# Patient Record
Sex: FEMALE | Race: ASIAN | NOT HISPANIC OR LATINO | Employment: FULL TIME | URBAN - METROPOLITAN AREA
[De-identification: names, ages, dates, MRNs, and addresses within clinical notes are randomized per-mention and may not be internally consistent; named-entity substitution may affect disease eponyms.]

---

## 2017-01-09 ENCOUNTER — ALLSCRIPTS OFFICE VISIT (OUTPATIENT)
Dept: OTHER | Facility: OTHER | Age: 62
End: 2017-01-09

## 2017-01-09 ENCOUNTER — LAB REQUISITION (OUTPATIENT)
Dept: LAB | Facility: HOSPITAL | Age: 62
End: 2017-01-09
Payer: COMMERCIAL

## 2017-01-09 DIAGNOSIS — J06.9 ACUTE UPPER RESPIRATORY INFECTION: ICD-10-CM

## 2017-01-09 LAB
FLUAV AG SPEC QL IA: POSITIVE
FLUBV AG SPEC QL IA: NEGATIVE

## 2017-01-09 PROCEDURE — 87400 INFLUENZA A/B EACH AG IA: CPT | Performed by: INTERNAL MEDICINE

## 2017-01-10 ENCOUNTER — GENERIC CONVERSION - ENCOUNTER (OUTPATIENT)
Dept: OTHER | Facility: OTHER | Age: 62
End: 2017-01-10

## 2017-01-24 ENCOUNTER — APPOINTMENT (OUTPATIENT)
Dept: PHYSICAL THERAPY | Facility: CLINIC | Age: 62
End: 2017-01-24
Payer: COMMERCIAL

## 2017-01-24 PROCEDURE — 97162 PT EVAL MOD COMPLEX 30 MIN: CPT

## 2017-01-26 ENCOUNTER — APPOINTMENT (OUTPATIENT)
Dept: PHYSICAL THERAPY | Facility: CLINIC | Age: 62
End: 2017-01-26
Payer: COMMERCIAL

## 2017-01-26 PROCEDURE — 97110 THERAPEUTIC EXERCISES: CPT

## 2017-01-26 PROCEDURE — 97012 MECHANICAL TRACTION THERAPY: CPT

## 2017-01-26 PROCEDURE — 97140 MANUAL THERAPY 1/> REGIONS: CPT

## 2017-01-30 ENCOUNTER — APPOINTMENT (OUTPATIENT)
Dept: PHYSICAL THERAPY | Facility: CLINIC | Age: 62
End: 2017-01-30
Payer: COMMERCIAL

## 2017-02-01 ENCOUNTER — APPOINTMENT (OUTPATIENT)
Dept: PHYSICAL THERAPY | Facility: CLINIC | Age: 62
End: 2017-02-01
Payer: COMMERCIAL

## 2017-02-01 PROCEDURE — 97110 THERAPEUTIC EXERCISES: CPT

## 2017-02-01 PROCEDURE — 97140 MANUAL THERAPY 1/> REGIONS: CPT

## 2017-02-06 ENCOUNTER — APPOINTMENT (OUTPATIENT)
Dept: PHYSICAL THERAPY | Facility: CLINIC | Age: 62
End: 2017-02-06
Payer: COMMERCIAL

## 2017-02-06 PROCEDURE — 97140 MANUAL THERAPY 1/> REGIONS: CPT

## 2017-02-06 PROCEDURE — 97110 THERAPEUTIC EXERCISES: CPT

## 2017-02-06 PROCEDURE — 97010 HOT OR COLD PACKS THERAPY: CPT

## 2017-02-08 ENCOUNTER — APPOINTMENT (OUTPATIENT)
Dept: PHYSICAL THERAPY | Facility: CLINIC | Age: 62
End: 2017-02-08
Payer: COMMERCIAL

## 2017-02-10 ENCOUNTER — APPOINTMENT (OUTPATIENT)
Dept: PHYSICAL THERAPY | Facility: CLINIC | Age: 62
End: 2017-02-10
Payer: COMMERCIAL

## 2017-02-10 PROCEDURE — 97140 MANUAL THERAPY 1/> REGIONS: CPT

## 2017-02-10 PROCEDURE — 97010 HOT OR COLD PACKS THERAPY: CPT

## 2017-02-10 PROCEDURE — 97110 THERAPEUTIC EXERCISES: CPT

## 2017-02-13 ENCOUNTER — APPOINTMENT (OUTPATIENT)
Dept: PHYSICAL THERAPY | Facility: CLINIC | Age: 62
End: 2017-02-13
Payer: COMMERCIAL

## 2017-02-13 PROCEDURE — 97140 MANUAL THERAPY 1/> REGIONS: CPT

## 2017-02-13 PROCEDURE — 97010 HOT OR COLD PACKS THERAPY: CPT

## 2017-02-13 PROCEDURE — 97110 THERAPEUTIC EXERCISES: CPT

## 2017-02-15 ENCOUNTER — APPOINTMENT (OUTPATIENT)
Dept: PHYSICAL THERAPY | Facility: CLINIC | Age: 62
End: 2017-02-15
Payer: COMMERCIAL

## 2017-02-20 ENCOUNTER — APPOINTMENT (OUTPATIENT)
Dept: PHYSICAL THERAPY | Facility: CLINIC | Age: 62
End: 2017-02-20
Payer: COMMERCIAL

## 2017-02-20 PROCEDURE — 97110 THERAPEUTIC EXERCISES: CPT

## 2017-02-20 PROCEDURE — 97140 MANUAL THERAPY 1/> REGIONS: CPT

## 2017-03-02 ENCOUNTER — HOSPITAL ENCOUNTER (OUTPATIENT)
Dept: RADIOLOGY | Facility: CLINIC | Age: 62
Discharge: HOME | End: 2017-03-02
Payer: COMMERCIAL

## 2017-03-02 ENCOUNTER — ALLSCRIPTS OFFICE VISIT (OUTPATIENT)
Dept: OTHER | Facility: OTHER | Age: 62
End: 2017-03-02

## 2017-03-02 DIAGNOSIS — M25.552 PAIN IN LEFT HIP: ICD-10-CM

## 2017-03-02 DIAGNOSIS — M25.551 PAIN IN RIGHT HIP: ICD-10-CM

## 2017-03-02 PROCEDURE — 73502 X-RAY EXAM HIP UNI 2-3 VIEWS: CPT

## 2017-04-11 ENCOUNTER — ALLSCRIPTS OFFICE VISIT (OUTPATIENT)
Dept: OTHER | Facility: OTHER | Age: 62
End: 2017-04-11

## 2017-10-30 ENCOUNTER — APPOINTMENT (OUTPATIENT)
Dept: PHYSICAL THERAPY | Facility: CLINIC | Age: 62
End: 2017-10-30
Payer: COMMERCIAL

## 2017-10-30 PROCEDURE — 97161 PT EVAL LOW COMPLEX 20 MIN: CPT

## 2017-10-30 PROCEDURE — G8990 OTHER PT/OT CURRENT STATUS: HCPCS

## 2017-10-30 PROCEDURE — G8991 OTHER PT/OT GOAL STATUS: HCPCS

## 2017-10-31 ENCOUNTER — APPOINTMENT (OUTPATIENT)
Dept: PHYSICAL THERAPY | Facility: CLINIC | Age: 62
End: 2017-10-31
Payer: COMMERCIAL

## 2018-01-10 NOTE — RESULT NOTES
Verified Results  (1) RAPID INFLUENZA SCREEN with reflex to PCR [id number] 11:00AM [facility]     Test Name Result Flag Reference   RAPID INFLUENZA A AGN Positive A Negative, Indeterminate   RAPID INFLUENZA B AGN Negative  Negative, Indeterminate

## 2018-01-12 VITALS
RESPIRATION RATE: 16 BRPM | BODY MASS INDEX: 21.4 KG/M2 | SYSTOLIC BLOOD PRESSURE: 98 MMHG | TEMPERATURE: 97.7 F | DIASTOLIC BLOOD PRESSURE: 60 MMHG | HEIGHT: 60 IN | HEART RATE: 68 BPM | WEIGHT: 109 LBS

## 2018-01-13 VITALS
WEIGHT: 110.31 LBS | SYSTOLIC BLOOD PRESSURE: 78 MMHG | DIASTOLIC BLOOD PRESSURE: 58 MMHG | BODY MASS INDEX: 21.66 KG/M2 | TEMPERATURE: 100.5 F | HEIGHT: 60 IN | HEART RATE: 72 BPM

## 2018-01-15 VITALS
SYSTOLIC BLOOD PRESSURE: 88 MMHG | WEIGHT: 109.5 LBS | DIASTOLIC BLOOD PRESSURE: 56 MMHG | HEIGHT: 60 IN | BODY MASS INDEX: 21.5 KG/M2

## 2018-02-22 PROBLEM — M16.12 PRIMARY OSTEOARTHRITIS OF LEFT HIP: Status: ACTIVE | Noted: 2017-03-02

## 2018-02-27 ENCOUNTER — OFFICE VISIT (OUTPATIENT)
Dept: FAMILY MEDICINE CLINIC | Facility: HOSPITAL | Age: 63
End: 2018-02-27
Payer: COMMERCIAL

## 2018-02-27 VITALS
BODY MASS INDEX: 21.48 KG/M2 | SYSTOLIC BLOOD PRESSURE: 116 MMHG | HEART RATE: 71 BPM | DIASTOLIC BLOOD PRESSURE: 68 MMHG | WEIGHT: 110 LBS

## 2018-02-27 DIAGNOSIS — R35.0 URINARY FREQUENCY: ICD-10-CM

## 2018-02-27 DIAGNOSIS — M51.36 DEGENERATIVE DISC DISEASE, LUMBAR: Primary | ICD-10-CM

## 2018-02-27 PROCEDURE — 99214 OFFICE O/P EST MOD 30 MIN: CPT | Performed by: INTERNAL MEDICINE

## 2018-02-27 RX ORDER — CELECOXIB 100 MG/1
100 CAPSULE ORAL DAILY
Qty: 30 CAPSULE | Refills: 0 | Status: SHIPPED | OUTPATIENT
Start: 2018-02-27 | End: 2018-12-17 | Stop reason: SDUPTHER

## 2018-02-27 RX ORDER — AZELASTINE 1 MG/ML
1 SPRAY, METERED NASAL 2 TIMES DAILY
COMMUNITY
End: 2021-09-01 | Stop reason: SDUPTHER

## 2018-02-27 NOTE — PROGRESS NOTES
Assessment/Plan:       Diagnoses and all orders for this visit:    Degenerative disc disease, lumbar  -     Ambulatory referral to Physical Therapy; Future  -     celecoxib (CeleBREX) 100 mg capsule; Take 1 capsule (100 mg total) by mouth daily    Urinary frequency  -     UA w Reflex to Microscopic w Reflex to Culture    Other orders  -     azelastine (ASTELIN) 0 1 % nasal spray; 1 spray into each nostril 2 (two) times a day  -     Cetirizine HCl 10 MG CAPS; Take 1 tablet by mouth daily          All of the above diagnoses have been assessed  Additional COMMENTS/PLAN:   Patient will get a urine specimen if there is no evidence of infection she still has urinary symptoms may need Urology evaluation  If the patient does not improve after a month of conservative treatment for back, would consider MRI  Subjective:      Patient ID: Kathy Nova is a 59 y o  female  HPI    The patient has a history of cervical and lumbar spine disease as evidenced by previous MRI she showed before 2013 2014  Patient for the last year as had some problems with low back pain but since the last 2 months  Patient has had increasing pain in the low back radiating down the right leg in the anterior medial portion of the leg to the knee  She also has pain of the great toe  It is worse with standing for prolonged period time walking  Rest makes it better  Does not cause insomnia  Has not taken any OTC meds  The following portions of the patient's history were revised and updated as appropriate: Problem list, allergies, med list, FH, SH, Past medical and surgical histories      Current Outpatient Prescriptions   Medication Sig Dispense Refill    azelastine (ASTELIN) 0 1 % nasal spray 1 spray into each nostril 2 (two) times a day      Cetirizine HCl 10 MG CAPS Take 1 tablet by mouth daily      celecoxib (CeleBREX) 100 mg capsule Take 1 capsule (100 mg total) by mouth daily 30 capsule 0     No current facility-administered medications for this visit  Review of Systems   Constitutional: Negative  Respiratory: Negative  Cardiovascular: Negative  Gastrointestinal: Negative  Genitourinary:        Has urinary frequency-no dysuria         Objective:    /68   Pulse 71   Wt 49 9 kg (110 lb)   BMI 21 48 kg/m²      Physical Exam   Musculoskeletal:   Examination spine reveals no point tenderness  There is full range of motion of the spine  Straight leg raise is negative  There is no motor weakness  There is no decrement pinprick  Deep tendon reflexes are 2+ bilaterally the toes are downgoing bilaterally  No visits with results within 2 Week(s) from this visit     Latest known visit with results is:   Lab Requisition on 01/09/2017   Component Date Value Ref Range Status    Rapid Influenza A Ag 01/09/2017 Positive* Negative, Indeterminate Final    Rapid Influenza B Ag 01/09/2017 Negative  Negative, Indeterminate Final         Huong Castano MD

## 2018-02-28 DIAGNOSIS — M54.9 ACUTE BACK PAIN, UNSPECIFIED BACK LOCATION, UNSPECIFIED BACK PAIN LATERALITY: Primary | ICD-10-CM

## 2018-02-28 RX ORDER — CYCLOBENZAPRINE HCL 10 MG
10 TABLET ORAL 2 TIMES DAILY PRN
Qty: 40 TABLET | Refills: 1 | Status: SHIPPED | OUTPATIENT
Start: 2018-02-28 | End: 2019-01-02 | Stop reason: SDUPTHER

## 2018-03-06 ENCOUNTER — EVALUATION (OUTPATIENT)
Dept: PHYSICAL THERAPY | Facility: CLINIC | Age: 63
End: 2018-03-06
Payer: COMMERCIAL

## 2018-03-06 DIAGNOSIS — M54.16 LUMBAR RADICULOPATHY: Primary | ICD-10-CM

## 2018-03-06 DIAGNOSIS — M51.36 DEGENERATIVE DISC DISEASE, LUMBAR: ICD-10-CM

## 2018-03-06 PROCEDURE — 97161 PT EVAL LOW COMPLEX 20 MIN: CPT | Performed by: PHYSICAL THERAPIST

## 2018-03-06 PROCEDURE — G8991 OTHER PT/OT GOAL STATUS: HCPCS | Performed by: PHYSICAL THERAPIST

## 2018-03-06 PROCEDURE — G8990 OTHER PT/OT CURRENT STATUS: HCPCS | Performed by: PHYSICAL THERAPIST

## 2018-03-06 NOTE — PROGRESS NOTES
PT Evaluation     Today's date: 3/6/2018  Patient name: Zonia Cronin  : 1955  MRN: [id number]  Referring provider: Cory Jones MD  Dx:   Encounter Diagnosis     ICD-10-CM    1  Degenerative disc disease, lumbar M51 36 Ambulatory referral to Physical Therapy                  Assessment/Plan     Pt presents to outpatient PT with pain, decreased rom, decreased strength and decreased functional mobility  She will benefit from skilled PT to address these deficits in order to achieve her goals and maximize her functional mobility  PT 2-3x's/week for 4-6 weeks    Short Term Goals: Independent performance of initial hep  Decrease pain 2 points on VAS      Long Term Goals: Independent performance of comprehensive hep  Work performance is returned to max level of function  Performance of IADL's is returned to max level of function  Performance in recreational activities is improved to max level of function          Subjective     Pt reports a history or chronic LBP  Reports that this has been aggravated for 2-3 months that she attributes to working longer hours at work  Reports that she has increased pain with sitting/standing for extended periods of time  No new imaging conducted        Pain currently 7/10  At worst     Objective     ROM:   Flexion: wfl   Extension: mod limited   Right Rotation: min limited   Left Rotation: min limited   Right Lateral Flexion: min limited   Left Lateral Flexion:  Min limited  Stiffness reported with end range motion in all planes        Poor control of abdominals noted with TaA  Hypomobility noted with spring testing  Straight Leg Raise: neg  Slump Test: neg  Prone Knee Bend: neg  Directional testing:no change            Precautions: none    Daily Treatment Diary     Manual              Lumbar pa's               lumbopelvic grade 5                                                        Exercise Diary              bike             Hs stretch             ltr Prone press ups             Gluteal stretch             TaA             TaA fabienne             pball squats                                                                                                                                                                             Modalities

## 2018-03-07 ENCOUNTER — APPOINTMENT (OUTPATIENT)
Dept: PHYSICAL THERAPY | Facility: CLINIC | Age: 63
End: 2018-03-07
Payer: COMMERCIAL

## 2018-03-09 ENCOUNTER — OFFICE VISIT (OUTPATIENT)
Dept: PHYSICAL THERAPY | Facility: CLINIC | Age: 63
End: 2018-03-09
Payer: COMMERCIAL

## 2018-03-09 DIAGNOSIS — M51.36 DEGENERATIVE DISC DISEASE, LUMBAR: Primary | ICD-10-CM

## 2018-03-09 DIAGNOSIS — M54.16 LUMBAR RADICULOPATHY: ICD-10-CM

## 2018-03-09 PROCEDURE — 97112 NEUROMUSCULAR REEDUCATION: CPT

## 2018-03-09 PROCEDURE — 97110 THERAPEUTIC EXERCISES: CPT

## 2018-03-09 NOTE — PROGRESS NOTES
Daily Note     Today's date: 3/9/2018  Patient name: Breanne Barahona  : 1955  MRN: [id number]  Referring provider: Beni Malcolm MD  Dx:   Encounter Diagnosis     ICD-10-CM    1  Degenerative disc disease, lumbar M51 36    2  Lumbar radiculopathy M54 16            1:1 with PT MB 12:30- 1:00  1:1 with PTA CR 1:00- 1:15  Subjective: Reports feeling better since I E  Compliance with HEP offering no questions or concerns  L-S pain 6/10  Denies radicular symptoms  Onset of right UT pain yesterday  Objective: See treatment diary below      Precautions: none    Daily Treatment Diary     Manual  3/9            Lumbar pa's   8 mins PT KL            lumbopelvic grade 5 NP                                                       Exercise Diary  3/9            bike 10 mins            Hs stretch 30"x4            ltr   10"x10            Prone press ups 5"x10            Gluteal stretch 30"x4            TaA 5"x20            TaA clamshell RTB x20ea  pball squats NV                                                                                                                                                                            Modalities  3/9                                                       Assessment: Tolerated treatment well  Progressed through  [facility] as charted without complaint  Deferred modalities post  Progress as appropriate  Patient exhibited good technique with therapeutic exercises and would benefit from continued PT      Plan: Continue per plan of care

## 2018-03-14 ENCOUNTER — OFFICE VISIT (OUTPATIENT)
Dept: PHYSICAL THERAPY | Facility: CLINIC | Age: 63
End: 2018-03-14
Payer: COMMERCIAL

## 2018-03-14 DIAGNOSIS — M51.36 DEGENERATIVE DISC DISEASE, LUMBAR: Primary | ICD-10-CM

## 2018-03-14 DIAGNOSIS — M54.16 LUMBAR RADICULOPATHY: ICD-10-CM

## 2018-03-14 PROCEDURE — 97112 NEUROMUSCULAR REEDUCATION: CPT | Performed by: PHYSICAL THERAPIST

## 2018-03-14 PROCEDURE — 97010 HOT OR COLD PACKS THERAPY: CPT | Performed by: PHYSICAL THERAPIST

## 2018-03-14 PROCEDURE — 97110 THERAPEUTIC EXERCISES: CPT | Performed by: PHYSICAL THERAPIST

## 2018-03-14 NOTE — PROGRESS NOTES
Daily Note     Today's date: 3/14/2018  Patient name: Rose Hollins  : 1955  MRN: [id number]  Referring provider: Yasmany Barker MD  Dx:   Encounter Diagnosis     ICD-10-CM    1  Degenerative disc disease, lumbar M51 36    2  Lumbar radiculopathy M54 16            1:1 for duration of session, ROSETTE from 12:00-12:15pm, MD from 12:15-1:00pm       Subjective: Pt reports her pain has been relatively unchanged since last session  She rates her pain at 6/10 prior to session and 5/10 at conclusion  Objective: See treatment diary below      Precautions: none    Daily Treatment Diary     Manual  3/9 3/14           Lumbar pa's   8 mins PT KL 8 min             lumbopelvic grade 5 NP NP                                                      Exercise Diary  3/9 3/14           bike 10 mins 10 min           Hs stretch 30"x4 30"x4           ltr   10"x10 10" x10  *pain  Hold          Prone press ups 5"x10 5" x 10           Gluteal stretch 30"x4 30" x 4           TaA 5"x20 5" x 20           TaA clamshell RTB x20ea  RTB   5" x 20 ea           pball squats NV NV                                                                                                                                                                           Modalities  3/9 3/14           MHP L-spine in prone  10 min                                         Assessment:  Pt tolerated treatment well and reported decreased pain at conclusion of session  She noted increase in lumbar pain with completion of LTR, therefore, will hold this exercise next visit  Patient exhibited good technique with therapeutic exercises and would benefit from continued PT      Plan: Continue per plan of care

## 2018-03-16 ENCOUNTER — OFFICE VISIT (OUTPATIENT)
Dept: PHYSICAL THERAPY | Facility: CLINIC | Age: 63
End: 2018-03-16
Payer: COMMERCIAL

## 2018-03-16 DIAGNOSIS — M54.16 LUMBAR RADICULOPATHY: ICD-10-CM

## 2018-03-16 DIAGNOSIS — M51.36 DEGENERATIVE DISC DISEASE, LUMBAR: Primary | ICD-10-CM

## 2018-03-16 PROCEDURE — 97110 THERAPEUTIC EXERCISES: CPT

## 2018-03-16 PROCEDURE — 97010 HOT OR COLD PACKS THERAPY: CPT

## 2018-03-16 PROCEDURE — 97140 MANUAL THERAPY 1/> REGIONS: CPT

## 2018-03-16 PROCEDURE — 97112 NEUROMUSCULAR REEDUCATION: CPT

## 2018-03-16 NOTE — PROGRESS NOTES
Daily Note     Today's date: 3/16/2018  Patient name: Elgin Zendejas  : 1955  MRN: [id number]  Referring provider: Shannon Jarrett MD  Dx:   Encounter Diagnosis     ICD-10-CM    1  Degenerative disc disease, lumbar M51 36    2  Lumbar radiculopathy M54 16                   Subjective: Patient reports she still has pain  Sometimes the stretching relieves her symptoms  Would like heat on upper and lower back today  Objective: See treatment diary below      Assessment: Patient reported some numbness going into RLE after attempt to perform mini squats  Patient reported numbness dissipated after manual therapy  Remaining TE went well and did not increase pain  Tolerated treatment well  Patient would benefit from continued PT      Plan: Continue per plan of care  Precautions: none     Daily Treatment Diary      Manual  3/9 3/14  3/16                  Lumbar pa's    8 mins PT KL 8 min     8 min KL, PT                 lumbopelvic grade 5 NP NP                                                                                                 Exercise Diary  3/9 3/14  3/16                 bike 10 mins 10 min  10 min                 Hs stretch 30"x4 30"x4  30" x4                 ltr    10"x10 10" x10  *pain  Hold                 Prone press ups 5"x10 5" x 10  5"x10                 Gluteal stretch 30"x4 30" x 4  30"x4                 TaA 5"x20 5" x 20  5"x20                 TaA clamshell RTB x20ea   RTB   5" x 20 ea  RTB 5"x20                 pball squats NV NV  20x                                                                                                                                                                                                                                                                                                                       Modalities  3/9 3/14                   MHP L-spine in prone   10 min

## 2018-03-20 ENCOUNTER — OFFICE VISIT (OUTPATIENT)
Dept: PHYSICAL THERAPY | Facility: CLINIC | Age: 63
End: 2018-03-20
Payer: COMMERCIAL

## 2018-03-20 DIAGNOSIS — M54.16 LUMBAR RADICULOPATHY: ICD-10-CM

## 2018-03-20 DIAGNOSIS — M51.36 DEGENERATIVE DISC DISEASE, LUMBAR: Primary | ICD-10-CM

## 2018-03-20 PROCEDURE — 97112 NEUROMUSCULAR REEDUCATION: CPT | Performed by: PHYSICAL THERAPIST

## 2018-03-20 PROCEDURE — 97140 MANUAL THERAPY 1/> REGIONS: CPT | Performed by: PHYSICAL THERAPIST

## 2018-03-20 PROCEDURE — 97110 THERAPEUTIC EXERCISES: CPT | Performed by: PHYSICAL THERAPIST

## 2018-03-20 NOTE — PROGRESS NOTES
Daily Note     Today's date: 3/20/2018  Patient name: Bhavesh Cleary  : 1955  MRN: [id number]  Referring provider: Vivien Garcia MD  Dx:   Encounter Diagnosis     ICD-10-CM    1  Degenerative disc disease, lumbar M51 36    2  Lumbar radiculopathy M54 16        Start Time: 1205  Stop Time: 1250  Total time in clinic (min): 45 minutes    Subjective: Patient notes she is feeling less stiff overall but continues to have 8/10 lumbar spine pain that can radiate down B LE      Objective: See treatment diary below  Manual  3/9 3/14  3/16   3/20               Lumbar pa's    8 mins PT KL 8 min     8 min KL, PT  8'  RS PT               lumbopelvic grade 5 NP NP                                                                                                 Exercise Diary  3/9 3/14  3/16  3/20               bike 10 mins 10 min  10 min  10'               Hs stretch 30"x4 30"x4  30" x4  30" x4               ltr    10"x10 10" x10  *pain  Hold                 Prone press ups 5"x10 5" x 10  5"x10  5" x10               Gluteal stretch 30"x4 30" x 4  30"x4  30" x4               TaA 5"x20 5" x 20  5"x20  5" x20               TaA clamshell RTB x20ea  RTB   5" x 20 ea  RTB 5"x20  RTB 5" x20 ea               pball squats NV NV  20x  20x                TrA hip ADD with pillow, supine       10x10"                standing hip ABD        20x each                                                                                                                                                                                                                                                                     Modalities  3/9 3/14                   MHP L-spine in prone   10 min                                                  Assessment: Tolerated treatment well  Patient demonstrated fatigue post treatment and would benefit from continued PT  Patient had improved lumbar mobility post manuals    Patient was progressed with program as able to improve core stability with good tolerance  Plan: Continue per plan of care

## 2018-03-23 ENCOUNTER — APPOINTMENT (OUTPATIENT)
Dept: PHYSICAL THERAPY | Facility: CLINIC | Age: 63
End: 2018-03-23
Payer: COMMERCIAL

## 2018-03-27 ENCOUNTER — OFFICE VISIT (OUTPATIENT)
Dept: PHYSICAL THERAPY | Facility: CLINIC | Age: 63
End: 2018-03-27
Payer: COMMERCIAL

## 2018-03-27 DIAGNOSIS — M54.16 LUMBAR RADICULOPATHY: Primary | ICD-10-CM

## 2018-03-27 PROCEDURE — 97140 MANUAL THERAPY 1/> REGIONS: CPT

## 2018-03-27 PROCEDURE — 97010 HOT OR COLD PACKS THERAPY: CPT

## 2018-03-27 PROCEDURE — 97110 THERAPEUTIC EXERCISES: CPT

## 2018-03-27 NOTE — PROGRESS NOTES
Daily Note     Today's date: 3/27/2018  Patient name: Tati Roberson  : 1955  MRN: [id number]  Referring provider: Carlin Del Toro MD  Dx:   No diagnosis found  Subjective: Patient complains she is still having pain in her back - reports taking Celebrex and muscle relaxer helps to reduce pain however she does not like taking these medications often as they "sometimes make me feel tired and depressed "  Patient reports she walked 1 1/2 miles on  and felt "okay "    Objective: See treatment diary below  X=performed    Manual  3/9 3/14  3/16   3/20  3/27             Lumbar pa's    8 mins PT KL 8 min     8 min KL, PT  8'  RS PT  NP             lumbopelvic grade 5 NP NP                   STM R lumbar      10'              Exercise Diary  3/9 3/14  3/16  3/20  3/27             bike 10 mins 10 min  10 min  10'  10'             Hs stretch 30"x4 30"x4  30" x4  30" x4  30"x4             ltr    10"x10 10" x10  *pain  Hold                 Prone press ups 5"x10 5" x 10  5"x10  5" x10               Gluteal stretch 30"x4 30" x 4  30"x4  30" x4  30"x4             TaA 5"x20 5" x 20  5"x20  5" x20  5"x20             TaA clamshell RTB x20ea  RTB   5" x 20 ea  RTB 5"x20  RTB 5" x20 ea  red tb  2x10             pball squats NV NV  20x  20x                ball squeeze       10x10"  10"x10              standing hip ABD        20x each                     Modalities  3/9 3/14  3/27                 MHP L-spine in prone   10 min  10 min                    Assessment: Patient complains of moderate discomfort in the low back when performing ball squeeze  Pain relief reported with STM  Plan: Continue treatment as per PT plan of care

## 2018-04-03 ENCOUNTER — OFFICE VISIT (OUTPATIENT)
Dept: PHYSICAL THERAPY | Facility: CLINIC | Age: 63
End: 2018-04-03
Payer: COMMERCIAL

## 2018-04-03 DIAGNOSIS — M54.16 LUMBAR RADICULOPATHY: Primary | ICD-10-CM

## 2018-04-03 PROCEDURE — 97110 THERAPEUTIC EXERCISES: CPT

## 2018-04-03 PROCEDURE — 97140 MANUAL THERAPY 1/> REGIONS: CPT

## 2018-04-03 PROCEDURE — 97010 HOT OR COLD PACKS THERAPY: CPT

## 2018-04-03 NOTE — PROGRESS NOTES
Daily Note     Today's date: 4/3/2018  Patient name: Saritha Alas  : 1955  MRN: [id number]  Referring provider: Artie Knapp MD  Dx:   No diagnosis found  Subjective: Patient reports back pain was improved after the last visit  Objective: See treatment diary below  PT performs mobilizations as noted  X=performed    Manual  3/9 3/14  3/16   3/20  3/27  4/3           Lumbar pa's    8 mins PT KL 8 min     8 min KL, PT  8'  RS PT  NP KL, PT           lumbopelvic grade 5 NP NP                   STM R lumbar      JLW JLW       Hamstring stretch      30"x3             Exercise Diary  3/9 3/14  3/16  3/20  3/27  4/3           bike 10 mins 10 min  10 min  10'  10'  10'           Hs stretch 30"x4 30"x4  30" x4  30" x4  30"x4  30"x4           ltr    10"x10 10" x10  *pain  Hold                 Prone press ups 5"x10 5" x 10  5"x10  5" x10               Gluteal stretch 30"x4 30" x 4  30"x4  30" x4  30"x4  30"x4           TaA 5"x20 5" x 20  5"x20  5" x20  5"x20  5"x20           TaA clamshell RTB x20ea  RTB   5" x 20 ea  RTB 5"x20  RTB 5" x20 ea  red tb  2x10  red tb  2x10           pball squats NV NV  20x  20x                ball squeeze       10x10"  10"x10  10"x10            standing hip ABD        20x each                     Modalities  3/9 3/14  3/27  4/3               MHP L-spine in prone   10 min  10 min  10 min                  Assessment: No complaints reported throughout therapeutic exercise program   Patient reports back pain is improved with manual hamstring stretching and massage  Plan: Continue treatment as per PT plan of care

## 2018-04-17 ENCOUNTER — OFFICE VISIT (OUTPATIENT)
Dept: PHYSICAL THERAPY | Facility: CLINIC | Age: 63
End: 2018-04-17
Payer: COMMERCIAL

## 2018-04-17 DIAGNOSIS — M54.16 LUMBAR RADICULOPATHY: Primary | ICD-10-CM

## 2018-04-17 PROCEDURE — 97530 THERAPEUTIC ACTIVITIES: CPT | Performed by: PHYSICAL THERAPIST

## 2018-04-17 PROCEDURE — 97140 MANUAL THERAPY 1/> REGIONS: CPT | Performed by: PHYSICAL THERAPIST

## 2018-04-17 PROCEDURE — G8991 OTHER PT/OT GOAL STATUS: HCPCS | Performed by: PHYSICAL THERAPIST

## 2018-04-17 PROCEDURE — G8990 OTHER PT/OT CURRENT STATUS: HCPCS | Performed by: PHYSICAL THERAPIST

## 2018-04-17 PROCEDURE — 97110 THERAPEUTIC EXERCISES: CPT | Performed by: PHYSICAL THERAPIST

## 2018-04-17 NOTE — PROGRESS NOTES
Daily Note     Today's date: 2018  Patient name: Alf Ayala  : 1955  MRN: [id number]  Referring provider: Leslie Baker MD  Dx:   No diagnosis found  Subjective: Patient reports back pain was improved after the last visit  Objective: See treatment diary below  PT performs mobilizations as noted  X=performed    Manual  3/9 3/14  3/16   3/20  3/27  4/3  4/17         Lumbar pa's    8 mins PT KL 8 min     8 min KL, PT  8'  RS PT  NP KL, PT  kl         lumbopelvic grade 5 NP NP                   STM R lumbar      JLW JLW kl      Hamstring stretch      30"x3 3            Exercise Diary  3/9 3/14  3/16  3/20  3/27  4/3  4/17         bike 10 mins 10 min  10 min  10'  10'  10'  10         Hs stretch 30"x4 30"x4  30" x4  30" x4  30"x4  30"x4           ltr    10"x10 10" x10  *pain  Hold                 Prone press ups 5"x10 5" x 10  5"x10  5" x10               Gluteal stretch 30"x4 30" x 4  30"x4  30" x4  30"x4  30"x4           TaA 5"x20 5" x 20  5"x20  5" x20  5"x20  5"x20           TaA clamshell RTB x20ea  RTB   5" x 20 ea  RTB 5"x20  RTB 5" x20 ea  red tb  2x10  red tb  2x10           pball squats NV NV  20x  20x                ball squeeze       10x10"  10"x10  10"x10            standing hip ABD        20x each                     Modalities  3/9 3/14  3/27  4/3               MHP L-spine in prone   10 min  10 min  10 min                  Assessment: No complaints reported throughout therapeutic exercise program   Patient reports back pain is improved with manual hamstring stretching and massage  Plan: Continue treatment as per PT plan of care

## 2018-04-17 NOTE — PROGRESS NOTES
PT Evaluation     Today's date: 2018  Patient name: Virgil Niño  : 1955  MRN: [id number]  Referring provider: Kristy Elias MD  Dx:   No diagnosis found  Assessment/Plan     Pt is being treated with outpatient PT  She has made slow gains in rom, strength, pain reduction and functional mobility but continues to have deficits  She is independent with her current hep and reports partial compliance with it  She may benefit from additional imagining at this time  Please advice on any updated status  Thank you for this referral       Plan: Hold PT at this time  Short Term Goals: Independent performance of initial hep-met  Decrease pain 2 points on VAS-met      Long Term Goals: Independent performance of comprehensive hep-partially met  Work performance is returned to max level of function-partially met  Performance of IADL's is returned to max level of function-partially met  Performance in recreational activities is improved to max level of function-partially met        Subjective     Pt reports overall reduction in pain since started PT but that she continues to have deficits  Reports that the intensity of her pain is decreased but that she continues to have significant pain after sitting for extended periods or time  Reports that she is able to walk for longer periods of time          Pain currently 3/10  At worst   5/10    Objective     ROM:   Flexion: wfl   Extension: mod limited   Right Rotation: wfl   Left Rotation: wfl   Right Lateral Flexion: wfl   Left Lateral Flexion:  Min wfl  Stiffness reported with end range motion in all planes      improved control of abdominals noted with TaA but pt continues to fatigue with a dynamic challenge    Straight Leg Raise: neg  Slump Test: neg  Prone Knee Bend: neg  Directional testing:no change  Reactive trigger points in R piriformis and paraspinals              Manual  3/9 3/14  3/16   3/20  3/27  4/3  4/17         Lumbar pa's    8 mins PT KL 8 min     8 min KL, PT  8'  RS PT  NP KL, PT  kl         lumbopelvic grade 5 NP NP                   STM R lumbar      JLW JLW kl      Hamstring stretch      30"x3 3            Exercise Diary  3/9 3/14  3/16  3/20  3/27  4/3  4/17         bike 10 mins 10 min  10 min  10'  10'  10'  10         Hs stretch 30"x4 30"x4  30" x4  30" x4  30"x4  30"x4           ltr    10"x10 10" x10  *pain  Hold                 Prone press ups 5"x10 5" x 10  5"x10  5" x10               Gluteal stretch 30"x4 30" x 4  30"x4  30" x4  30"x4  30"x4           TaA 5"x20 5" x 20  5"x20  5" x20  5"x20  5"x20           TaA clamshell RTB x20ea   RTB   5" x 20 ea  RTB 5"x20  RTB 5" x20 ea  red tb  2x10  red tb  2x10           pball squats NV NV  20x  20x                ball squeeze       10x10"  10"x10  10"x10            standing hip ABD        20x each                     Modalities  3/9 3/14  3/27  4/3               MHP L-spine in prone   10 min  10 min  10 min

## 2018-05-10 ENCOUNTER — OFFICE VISIT (OUTPATIENT)
Dept: FAMILY MEDICINE CLINIC | Facility: HOSPITAL | Age: 63
End: 2018-05-10
Payer: COMMERCIAL

## 2018-05-10 VITALS
DIASTOLIC BLOOD PRESSURE: 56 MMHG | SYSTOLIC BLOOD PRESSURE: 92 MMHG | WEIGHT: 111 LBS | BODY MASS INDEX: 21.79 KG/M2 | HEART RATE: 65 BPM | OXYGEN SATURATION: 98 % | HEIGHT: 60 IN

## 2018-05-10 DIAGNOSIS — M25.551 PAIN OF RIGHT HIP JOINT: Primary | ICD-10-CM

## 2018-05-10 DIAGNOSIS — Z12.11 SCREENING FOR COLON CANCER: ICD-10-CM

## 2018-05-10 PROCEDURE — 99213 OFFICE O/P EST LOW 20 MIN: CPT | Performed by: PHYSICIAN ASSISTANT

## 2018-05-10 NOTE — PROGRESS NOTES
Assessment/Plan:         Diagnoses and all orders for this visit:    Pain of right hip joint  -     XR hip/pelv 2-3 vws right if performed; Future  -     XR spine lumbar minimum 4 views non injury; Future  -     Ambulatory referral to Gastroenterology; Future    Screening for colon cancer  -     Ambulatory referral to Gastroenterology; Future        Subjective:      Patient ID: Karol Hess is a 59 y o  female  59year old  female c/o lower back pain, radiating to the right hip/thigh, and down right leg  Walking worsens pain, and prolonged sitting  Had physical therapy about 2 months  Occasionally takes pain medications  Eats 3 meals daily with protein  Back Pain   Pertinent negatives include no abdominal pain, dysuria, fever or numbness  Review of Systems   Constitutional: Negative for chills, diaphoresis, fatigue and fever  Respiratory: Negative for cough and shortness of breath  Gastrointestinal: Negative for abdominal pain, anal bleeding, blood in stool, constipation, diarrhea, nausea and vomiting  Endocrine: Negative for polydipsia, polyphagia and polyuria  Genitourinary: Positive for urgency  Negative for dysuria and frequency  Musculoskeletal: Positive for arthralgias, back pain, gait problem, myalgias, neck pain and neck stiffness  Neurological: Negative for numbness  Pain from right side of lower back down right leg  Objective:      BP 92/56   Pulse 65   Ht 5' (1 524 m)   Wt 50 3 kg (111 lb)   LMP  (LMP Unknown)   SpO2 98%   BMI 21 68 kg/m²          Physical Exam   Constitutional: She is oriented to person, place, and time  She appears well-developed and well-nourished  No distress  HENT:   Head: Normocephalic and atraumatic  Eyes: Conjunctivae and EOM are normal  Right eye exhibits no discharge  Left eye exhibits no discharge  No scleral icterus  Cardiovascular: Normal rate, regular rhythm and normal heart sounds      Pulmonary/Chest: Effort normal and breath sounds normal  No respiratory distress  She has no wheezes  She has no rales  Musculoskeletal: Normal range of motion  She exhibits tenderness  She exhibits no edema or deformity  Lower  to palpation  Neurological: She is alert and oriented to person, place, and time  Skin: She is not diaphoretic  Psychiatric: She has a normal mood and affect  Her behavior is normal  Judgment and thought content normal    Nursing note and vitals reviewed

## 2018-05-10 NOTE — PATIENT INSTRUCTIONS
Discussed patient's pain medications, and meal planning  Will order  XR of hip and lower back area  Referred to get colonoscopy

## 2018-05-17 ENCOUNTER — HOSPITAL ENCOUNTER (OUTPATIENT)
Dept: RADIOLOGY | Facility: HOSPITAL | Age: 63
Discharge: HOME | End: 2018-05-17
Payer: COMMERCIAL

## 2018-05-17 ENCOUNTER — TRANSCRIBE ORDERS (OUTPATIENT)
Dept: LAB | Facility: HOSPITAL | Age: 63
End: 2018-05-17

## 2018-05-17 DIAGNOSIS — M25.551 PAIN OF RIGHT HIP JOINT: ICD-10-CM

## 2018-05-17 PROCEDURE — 72110 X-RAY EXAM L-2 SPINE 4/>VWS: CPT

## 2018-05-17 PROCEDURE — 73502 X-RAY EXAM HIP UNI 2-3 VIEWS: CPT

## 2018-05-22 ENCOUNTER — OFFICE VISIT (OUTPATIENT)
Dept: FAMILY MEDICINE CLINIC | Facility: HOSPITAL | Age: 63
End: 2018-05-22
Payer: COMMERCIAL

## 2018-05-22 VITALS
OXYGEN SATURATION: 97 % | SYSTOLIC BLOOD PRESSURE: 82 MMHG | WEIGHT: 111.5 LBS | HEART RATE: 62 BPM | HEIGHT: 60 IN | DIASTOLIC BLOOD PRESSURE: 60 MMHG | BODY MASS INDEX: 21.89 KG/M2

## 2018-05-22 DIAGNOSIS — Z00.00 HEALTHCARE MAINTENANCE: ICD-10-CM

## 2018-05-22 DIAGNOSIS — R53.83 FATIGUE, UNSPECIFIED TYPE: ICD-10-CM

## 2018-05-22 DIAGNOSIS — B35.3 TINEA PEDIS OF RIGHT FOOT: Primary | ICD-10-CM

## 2018-05-22 DIAGNOSIS — L30.9 DERMATITIS: ICD-10-CM

## 2018-05-22 PROCEDURE — 99214 OFFICE O/P EST MOD 30 MIN: CPT | Performed by: PHYSICIAN ASSISTANT

## 2018-05-22 RX ORDER — TERBINAFINE HYDROCHLORIDE 250 MG/1
250 TABLET ORAL DAILY
Qty: 30 TABLET | Refills: 2 | Status: SHIPPED | OUTPATIENT
Start: 2018-05-22 | End: 2018-06-13 | Stop reason: ALTCHOICE

## 2018-05-22 RX ORDER — NYSTATIN 100000 U/G
CREAM TOPICAL AS NEEDED
Qty: 30 G | Refills: 2 | Status: SHIPPED | OUTPATIENT
Start: 2018-05-22 | End: 2018-12-15 | Stop reason: SDUPTHER

## 2018-05-22 NOTE — PATIENT INSTRUCTIONS
Patient to try Lidex cream on neck, and Nystatin on feet, with Lotrimin Powder  Also to start Lamisil 250 mg   Qd     Sent to get complete blood test

## 2018-05-22 NOTE — PROGRESS NOTES
Assessment/Plan:         Diagnoses and all orders for this visit:    Tinea pedis of right foot  -     terbinafine (LamISIL) 250 mg tablet; Take 1 tablet (250 mg total) by mouth daily for 30 days  -     nystatin (MYCOSTATIN) cream; Apply topically as needed (foot rash )      Dermatitis    Subjective:      Patient ID: Chas Dupont is a 59 y o  female  59year old  female c/o cuts between toes, right foot since past Sunday  Went hiking Sunday, and stepped in muddy trail, and feet became wet  Slight pain, and pruritis  Used alcohol to clean area  Also has rash on neck, right side, pruritic  Wound Check           Review of Systems   Constitutional: Negative for chills, diaphoresis, fatigue and fever  Respiratory: Negative for cough and shortness of breath  Skin: Positive for rash  Neurological: Negative for dizziness, light-headedness, numbness and headaches  Objective:      BP (!) 82/60   Pulse 62   Ht 5' (1 524 m)   Wt 50 6 kg (111 lb 8 oz)   LMP  (LMP Unknown)   SpO2 97%   BMI 21 78 kg/m²          Physical Exam   Constitutional: She appears well-developed and well-nourished  Pulmonary/Chest: Effort normal and breath sounds normal    Skin: Rash noted  There is erythema  Erythematous rash, with papules noted on right side of neck  White rash with fissures noted between toes of right foot, some erythema noted also  No swelling noted  Nursing note and vitals reviewed

## 2018-05-25 ENCOUNTER — TELEPHONE (OUTPATIENT)
Dept: FAMILY MEDICINE CLINIC | Facility: HOSPITAL | Age: 63
End: 2018-05-25

## 2018-06-13 ENCOUNTER — OFFICE VISIT (OUTPATIENT)
Dept: FAMILY MEDICINE CLINIC | Facility: HOSPITAL | Age: 63
End: 2018-06-13
Payer: COMMERCIAL

## 2018-06-13 VITALS
SYSTOLIC BLOOD PRESSURE: 110 MMHG | BODY MASS INDEX: 21.2 KG/M2 | WEIGHT: 108 LBS | HEART RATE: 64 BPM | TEMPERATURE: 97.3 F | HEIGHT: 60 IN | RESPIRATION RATE: 16 BRPM | DIASTOLIC BLOOD PRESSURE: 64 MMHG

## 2018-06-13 DIAGNOSIS — L30.9 ECZEMA, UNSPECIFIED TYPE: Primary | ICD-10-CM

## 2018-06-13 PROCEDURE — 99213 OFFICE O/P EST LOW 20 MIN: CPT | Performed by: INTERNAL MEDICINE

## 2018-06-13 PROCEDURE — 3008F BODY MASS INDEX DOCD: CPT | Performed by: INTERNAL MEDICINE

## 2018-06-13 PROCEDURE — 1036F TOBACCO NON-USER: CPT | Performed by: INTERNAL MEDICINE

## 2018-06-13 NOTE — PROGRESS NOTES
Assessment/Plan:    No problem-specific Assessment & Plan notes found for this encounter  Diagnoses and all orders for this visit:    Eczema, unspecified type      please use the fluocinonide cream twice a day for up to two weeks! Subjective:      Patient ID: Maryellen Tuttle is a 59 y o  female  Rash   This is a new problem  The current episode started 1 to 4 weeks ago  Location: right forearm and left wrist  The rash is characterized by dryness, redness and itchiness  Associated with: worse when exposed to water  Pertinent negatives include no fever  The following portions of the patient's history were reviewed and updated as appropriate: current medications, past family history, past medical history, past social history, past surgical history and problem list     Review of Systems   Constitutional: Negative for fever  Skin: Positive for rash  Objective:    /64   Pulse 64   Temp (!) 97 3 °F (36 3 °C) (Tympanic)   Resp 16   Ht 5' (1 524 m)   Wt 49 kg (108 lb)   LMP  (LMP Unknown)   BMI 21 09 kg/m²      Physical Exam   Constitutional: She appears well-developed  HENT:   Head: Normocephalic  Eyes: Conjunctivae are normal    Skin: Skin is warm and dry  Rash (erythematous papules with excoriations, see images I took) noted             Estephania Evans MD

## 2018-10-03 LAB
ALBUMIN SERPL-MCNC: 3.9 G/DL (ref 3.6–5.1)
ALBUMIN/GLOB SERPL: 1.4 (CALC) (ref 1–2.5)
ALP SERPL-CCNC: 64 U/L (ref 33–130)
ALT SERPL-CCNC: 16 U/L (ref 6–29)
AST SERPL-CCNC: 22 U/L (ref 10–35)
BASOPHILS # BLD AUTO: 69 CELLS/UL (ref 0–200)
BASOPHILS NFR BLD AUTO: 1.1 %
BILIRUB SERPL-MCNC: 0.6 MG/DL (ref 0.2–1.2)
BUN SERPL-MCNC: 7 MG/DL (ref 7–25)
BUN/CREAT SERPL: NORMAL (CALC) (ref 6–22)
CALCIUM SERPL-MCNC: 9.1 MG/DL (ref 8.6–10.4)
CHLORIDE SERPL-SCNC: 106 MMOL/L (ref 98–110)
CHOLEST SERPL-MCNC: 159 MG/DL
CHOLEST/HDLC SERPL: 2.3 (CALC)
CO2 SERPL-SCNC: 29 MMOL/L (ref 20–32)
CREAT SERPL-MCNC: 0.82 MG/DL (ref 0.5–0.99)
EOSINOPHIL # BLD AUTO: 120 CELLS/UL (ref 15–500)
EOSINOPHIL NFR BLD AUTO: 1.9 %
ERYTHROCYTE [DISTWIDTH] IN BLOOD BY AUTOMATED COUNT: 12.4 % (ref 11–15)
GLOBULIN SER CALC-MCNC: 2.7 G/DL (CALC) (ref 1.9–3.7)
GLUCOSE SERPL-MCNC: 80 MG/DL (ref 65–99)
HCT VFR BLD AUTO: 37.3 % (ref 35–45)
HDLC SERPL-MCNC: 69 MG/DL
HGB BLD-MCNC: 12.3 G/DL (ref 11.7–15.5)
LDLC SERPL CALC-MCNC: 69 MG/DL (CALC)
LYMPHOCYTES # BLD AUTO: 2627 CELLS/UL (ref 850–3900)
LYMPHOCYTES NFR BLD AUTO: 41.7 %
MCH RBC QN AUTO: 30.1 PG (ref 27–33)
MCHC RBC AUTO-ENTMCNC: 33 G/DL (ref 32–36)
MCV RBC AUTO: 91.2 FL (ref 80–100)
MONOCYTES # BLD AUTO: 542 CELLS/UL (ref 200–950)
MONOCYTES NFR BLD AUTO: 8.6 %
NEUTROPHILS # BLD AUTO: 2942 CELLS/UL (ref 1500–7800)
NEUTROPHILS NFR BLD AUTO: 46.7 %
NONHDLC SERPL-MCNC: 90 MG/DL (CALC)
PLATELET # BLD AUTO: 188 THOUSAND/UL (ref 140–400)
PMV BLD REES-ECKER: 9.8 FL (ref 7.5–12.5)
POTASSIUM SERPL-SCNC: 4 MMOL/L (ref 3.5–5.3)
PROT SERPL-MCNC: 6.6 G/DL (ref 6.1–8.1)
RBC # BLD AUTO: 4.09 MILLION/UL (ref 3.8–5.1)
SL AMB EGFR AFRICAN AMERICAN: 89 ML/MIN/1.73M2
SL AMB EGFR NON AFRICAN AMERICAN: 77 ML/MIN/1.73M2
SODIUM SERPL-SCNC: 140 MMOL/L (ref 135–146)
TRIGL SERPL-MCNC: 126 MG/DL
WBC # BLD AUTO: 6.3 THOUSAND/UL (ref 3.8–10.8)

## 2018-12-15 DIAGNOSIS — B35.3 TINEA PEDIS OF RIGHT FOOT: ICD-10-CM

## 2018-12-17 DIAGNOSIS — B35.3 TINEA PEDIS OF RIGHT FOOT: ICD-10-CM

## 2018-12-17 DIAGNOSIS — M51.36 DEGENERATIVE DISC DISEASE, LUMBAR: ICD-10-CM

## 2018-12-17 DIAGNOSIS — G47.9 SLEEP DISTURBANCE: Primary | ICD-10-CM

## 2018-12-17 RX ORDER — GABAPENTIN 100 MG/1
100 CAPSULE ORAL 3 TIMES DAILY
Qty: 90 CAPSULE | Refills: 0 | Status: SHIPPED | OUTPATIENT
Start: 2018-12-17 | End: 2020-12-07 | Stop reason: SDUPTHER

## 2018-12-17 RX ORDER — NYSTATIN 100000 U/G
CREAM TOPICAL
Qty: 30 G | Refills: 2 | Status: SHIPPED | OUTPATIENT
Start: 2018-12-17 | End: 2018-12-17 | Stop reason: SDUPTHER

## 2018-12-17 RX ORDER — ESTAZOLAM 1 MG
1 TABLET ORAL
Qty: 30 TABLET | Refills: 0 | OUTPATIENT
Start: 2018-12-17 | End: 2020-12-07 | Stop reason: SDUPTHER

## 2018-12-17 RX ORDER — CELECOXIB 100 MG/1
100 CAPSULE ORAL DAILY
Qty: 30 CAPSULE | Refills: 0 | Status: SHIPPED | OUTPATIENT
Start: 2018-12-17

## 2018-12-17 RX ORDER — NYSTATIN 100000 U/G
CREAM TOPICAL
Qty: 30 G | Refills: 0 | Status: SHIPPED | OUTPATIENT
Start: 2018-12-17 | End: 2019-02-14 | Stop reason: SDUPTHER

## 2019-01-02 ENCOUNTER — OFFICE VISIT (OUTPATIENT)
Dept: FAMILY MEDICINE CLINIC | Facility: HOSPITAL | Age: 64
End: 2019-01-02
Payer: COMMERCIAL

## 2019-01-02 VITALS
HEART RATE: 64 BPM | RESPIRATION RATE: 16 BRPM | BODY MASS INDEX: 21.2 KG/M2 | SYSTOLIC BLOOD PRESSURE: 110 MMHG | WEIGHT: 108 LBS | DIASTOLIC BLOOD PRESSURE: 72 MMHG | HEIGHT: 60 IN | TEMPERATURE: 96.8 F

## 2019-01-02 DIAGNOSIS — J32.9 SINUSITIS, UNSPECIFIED CHRONICITY, UNSPECIFIED LOCATION: Primary | ICD-10-CM

## 2019-01-02 PROCEDURE — 99213 OFFICE O/P EST LOW 20 MIN: CPT | Performed by: PHYSICIAN ASSISTANT

## 2019-01-02 RX ORDER — CEFUROXIME AXETIL 250 MG/1
250 TABLET ORAL EVERY 12 HOURS SCHEDULED
Qty: 20 TABLET | Refills: 0 | Status: SHIPPED | OUTPATIENT
Start: 2019-01-02 | End: 2019-01-04 | Stop reason: SDUPTHER

## 2019-01-02 RX ORDER — FLUTICASONE PROPIONATE 50 MCG
2 SPRAY, SUSPENSION (ML) NASAL DAILY
Qty: 1 BOTTLE | Refills: 2 | Status: SHIPPED | OUTPATIENT
Start: 2019-01-02 | End: 2019-02-20 | Stop reason: SDUPTHER

## 2019-01-02 NOTE — PROGRESS NOTES
Assessment/Plan:         Diagnoses and all orders for this visit:    Sinusitis, unspecified chronicity, unspecified location  -     cefuroxime (CEFTIN) 250 mg tablet; Take 1 tablet (250 mg total) by mouth every 12 (twelve) hours for 10 days  -     fluticasone (FLONASE) 50 mcg/act nasal spray; 2 sprays into each nostril daily        Subjective:      Patient ID: Sasha Mixon is a 59 y o  female  59year old  female c/o right ear pain past few weeks, and sore throat/gland pain, right side  Believes may have abscess right side of upper jaw  Has pressure and pain around right eye, and right temple area  Had shingles in the past around right eye, 2015  Was in [facility] recently, November 20th;   started Amoxicillin 1000 Mg  Bid for sinus sx /sore throat, for 4 weeks  Review of Systems   Constitutional: Negative for chills, diaphoresis, fatigue and fever  HENT: Positive for congestion, dental problem, ear pain, sinus pain, sinus pressure and sore throat  Negative for rhinorrhea  Slight congestion in chest, believes may be due to heater being on  Respiratory: Positive for cough  Negative for shortness of breath  Slight cough due to dryness  Neurological: Positive for headaches  Objective:      /72   Pulse 64   Temp (!) 96 8 °F (36 °C) (Tympanic)   Resp 16   Ht 5' (1 524 m)   Wt 49 kg (108 lb)   LMP  (LMP Unknown)   BMI 21 09 kg/m²          Physical Exam   Constitutional: She appears well-developed and well-nourished  No distress  HENT:   Head: Normocephalic and atraumatic  Mouth/Throat: Oropharynx is clear and moist  No oropharyngeal exudate  TM bulging, erythematous and congestion noted in middle ears  Turbinates inflamed  Eyes: Conjunctivae and EOM are normal  Right eye exhibits no discharge  Left eye exhibits no discharge  No scleral icterus  Pulmonary/Chest: Effort normal and breath sounds normal  No respiratory distress   She has no wheezes  She has no rales  Skin: She is not diaphoretic  Nursing note and vitals reviewed

## 2019-01-02 NOTE — PATIENT INSTRUCTIONS
Patient to start Flonase NS, and Ceftin 250mg  Bid for 10 days  Follow up with dentist   Increase water intake

## 2019-01-04 ENCOUNTER — TELEPHONE (OUTPATIENT)
Dept: FAMILY MEDICINE CLINIC | Facility: HOSPITAL | Age: 64
End: 2019-01-04

## 2019-01-04 DIAGNOSIS — J32.9 SINUSITIS, UNSPECIFIED CHRONICITY, UNSPECIFIED LOCATION: ICD-10-CM

## 2019-01-04 RX ORDER — CEFUROXIME AXETIL 250 MG/1
250 TABLET ORAL
Qty: 20 TABLET | Refills: 0 | Status: SHIPPED | OUTPATIENT
Start: 2019-01-04 | End: 2019-01-14

## 2019-02-14 ENCOUNTER — OFFICE VISIT (OUTPATIENT)
Dept: FAMILY MEDICINE CLINIC | Facility: HOSPITAL | Age: 64
End: 2019-02-14
Payer: COMMERCIAL

## 2019-02-14 VITALS
WEIGHT: 110 LBS | HEART RATE: 70 BPM | SYSTOLIC BLOOD PRESSURE: 96 MMHG | HEIGHT: 60 IN | OXYGEN SATURATION: 97 % | DIASTOLIC BLOOD PRESSURE: 58 MMHG | BODY MASS INDEX: 21.6 KG/M2

## 2019-02-14 DIAGNOSIS — B35.3 TINEA PEDIS OF RIGHT FOOT: ICD-10-CM

## 2019-02-14 DIAGNOSIS — B35.3 TINEA PEDIS OF BOTH FEET: Primary | ICD-10-CM

## 2019-02-14 PROCEDURE — 99213 OFFICE O/P EST LOW 20 MIN: CPT | Performed by: INTERNAL MEDICINE

## 2019-02-14 RX ORDER — NYSTATIN 100000 U/G
CREAM TOPICAL
Qty: 30 G | Refills: 5 | Status: SHIPPED | OUTPATIENT
Start: 2019-02-14 | End: 2019-05-06 | Stop reason: SDUPTHER

## 2019-02-14 NOTE — PROGRESS NOTES
Assessment/Plan:    No problem-specific Assessment & Plan notes found for this encounter  Diagnoses and all orders for this visit:    Tinea pedis of both feet    Tinea pedis of right foot  -     nystatin (MYCOSTATIN) cream; Apply to foot rash as needed      Will continue nystatin cream should athletes foot return  Pt will keep feet dry  Subjective:      Patient ID: Sasha Mixon is a 59 y o  female  Rash   This is a new problem  The current episode started 1 to 4 weeks ago  The affected locations include the left foot and right foot  Treatments tried: nystatin has helped a great deal  The treatment provided significant relief  The following portions of the patient's history were reviewed and updated as appropriate: current medications, past family history, past medical history, past social history, past surgical history and problem list     Review of Systems   Skin: Positive for rash  Objective:    BP 96/58   Pulse 70   Ht 5' (1 524 m)   Wt 49 9 kg (110 lb)   LMP  (LMP Unknown)   SpO2 97%   BMI 21 48 kg/m²      Physical Exam   Constitutional: She appears well-developed  No distress  HENT:   Head: Normocephalic  Eyes: Conjunctivae are normal    Neck: Neck supple  Cardiovascular: Normal rate and regular rhythm  Pulmonary/Chest: Effort normal  No respiratory distress  She has no wheezes  She has no rales  Lymphadenopathy:     She has no cervical adenopathy  Neurological: She is alert  No cranial nerve deficit  Skin: Skin is warm and dry  No rash (tinea pedis resolved) noted  Psychiatric: She has a normal mood and affect  Vitals reviewed            Shannon Benjamin MD

## 2019-02-20 DIAGNOSIS — J32.9 SINUSITIS, UNSPECIFIED CHRONICITY, UNSPECIFIED LOCATION: ICD-10-CM

## 2019-02-20 RX ORDER — FLUTICASONE PROPIONATE 50 MCG
2 SPRAY, SUSPENSION (ML) NASAL DAILY
Qty: 1 BOTTLE | Refills: 2 | Status: SHIPPED | OUTPATIENT
Start: 2019-02-20

## 2019-02-27 ENCOUNTER — OFFICE VISIT (OUTPATIENT)
Dept: FAMILY MEDICINE CLINIC | Facility: HOSPITAL | Age: 64
End: 2019-02-27
Payer: COMMERCIAL

## 2019-02-27 VITALS
TEMPERATURE: 96.4 F | HEART RATE: 68 BPM | HEIGHT: 60 IN | SYSTOLIC BLOOD PRESSURE: 98 MMHG | DIASTOLIC BLOOD PRESSURE: 60 MMHG | RESPIRATION RATE: 16 BRPM | WEIGHT: 109 LBS | BODY MASS INDEX: 21.4 KG/M2

## 2019-02-27 DIAGNOSIS — B02.9 HERPES ZOSTER WITHOUT COMPLICATION: Primary | ICD-10-CM

## 2019-02-27 DIAGNOSIS — H65.92 LEFT NON-SUPPURATIVE OTITIS MEDIA: ICD-10-CM

## 2019-02-27 PROCEDURE — 1036F TOBACCO NON-USER: CPT | Performed by: PHYSICIAN ASSISTANT

## 2019-02-27 PROCEDURE — 99213 OFFICE O/P EST LOW 20 MIN: CPT | Performed by: PHYSICIAN ASSISTANT

## 2019-02-27 PROCEDURE — 3008F BODY MASS INDEX DOCD: CPT | Performed by: PHYSICIAN ASSISTANT

## 2019-02-27 RX ORDER — AMOXICILLIN 500 MG/1
500 TABLET, FILM COATED ORAL
Qty: 45 TABLET | Refills: 0 | Status: SHIPPED | OUTPATIENT
Start: 2019-02-27 | End: 2019-03-14

## 2019-02-27 RX ORDER — AZELASTINE HCL 205.5 UG/1
SPRAY NASAL
COMMUNITY
Start: 2019-02-15 | End: 2019-10-07 | Stop reason: ALTCHOICE

## 2019-02-27 RX ORDER — VALACYCLOVIR HYDROCHLORIDE 1 G/1
1000 TABLET, FILM COATED ORAL 3 TIMES DAILY
Qty: 21 TABLET | Refills: 0 | Status: SHIPPED | OUTPATIENT
Start: 2019-02-27 | End: 2019-10-07 | Stop reason: ALTCHOICE

## 2019-02-27 RX ORDER — TRIAMCINOLONE ACETONIDE 55 UG/1
SPRAY, METERED NASAL
COMMUNITY
End: 2019-10-07 | Stop reason: ALTCHOICE

## 2019-02-27 NOTE — PROGRESS NOTES
Assessment/Plan:         Diagnoses and all orders for this visit:    Herpes zoster without complication  -     valACYclovir (VALTREX) 1,000 mg tablet; Take 1 tablet (1,000 mg total) by mouth 3 (three) times a day for 7 days    Left non-suppurative otitis media  -     amoxicillin (AMOXIL) 500 MG tablet; Take 1 tablet (500 mg total) by mouth 3 (three) times a day with meals for 15 days    Other orders  -     Azelastine HCl 0 15 % SOLN  -     Triamcinolone Acetonide 55 MCG/ACT AERO; into each nostril        Subjective:      Patient ID: Haven Gustafson is a 62 y o  female  62year old  female c/o sever left ear pain since yesterday  Pain radiating to neck  Took Amoxicillin yesterday from old Rx  Which helped pain  Using Flonase, and Zyrtec  Did not like side effects of Ceftin from last visit, was seen in January with right ear pain  May be switched to new insurance, starting new job, requesting rx  For herpes, concerned sx  Similar to past episodes of herpes rash (shingles)  Review of Systems   Constitutional: Positive for chills  Negative for fever  HENT: Positive for congestion, ear pain, postnasal drip, sinus pressure, sinus pain and sore throat  Negative for rhinorrhea  Respiratory: Negative for cough and shortness of breath  Neurological: Positive for headaches  Objective:      BP 98/60   Pulse 68   Temp (!) 96 4 °F (35 8 °C) (Tympanic)   Resp 16   Ht 5' (1 524 m)   Wt 49 4 kg (109 lb)   LMP  (LMP Unknown)   BMI 21 29 kg/m²          Physical Exam   Constitutional: She is oriented to person, place, and time  She appears well-developed and well-nourished  No distress  HENT:   Head: Normocephalic and atraumatic  Right Ear: External ear normal    Mouth/Throat: Oropharynx is clear and moist  No oropharyngeal exudate  Left TM erythematous, turbinates inflamed  Eyes: EOM are normal  Right eye exhibits no discharge  Left eye exhibits no discharge   No scleral icterus  Pulmonary/Chest: Effort normal and breath sounds normal  No stridor  No respiratory distress  She has no wheezes  She has no rales  Neurological: She is alert and oriented to person, place, and time  Skin: She is not diaphoretic

## 2019-05-06 ENCOUNTER — OFFICE VISIT (OUTPATIENT)
Dept: FAMILY MEDICINE CLINIC | Facility: HOSPITAL | Age: 64
End: 2019-05-06
Payer: COMMERCIAL

## 2019-05-06 VITALS
HEART RATE: 64 BPM | WEIGHT: 107.5 LBS | SYSTOLIC BLOOD PRESSURE: 104 MMHG | HEIGHT: 60 IN | OXYGEN SATURATION: 97 % | BODY MASS INDEX: 21.1 KG/M2 | DIASTOLIC BLOOD PRESSURE: 68 MMHG

## 2019-05-06 DIAGNOSIS — L30.9 DERMATITIS: Primary | ICD-10-CM

## 2019-05-06 DIAGNOSIS — B35.3 TINEA PEDIS OF RIGHT FOOT: ICD-10-CM

## 2019-05-06 PROCEDURE — 1036F TOBACCO NON-USER: CPT | Performed by: PHYSICIAN ASSISTANT

## 2019-05-06 PROCEDURE — 3008F BODY MASS INDEX DOCD: CPT | Performed by: PHYSICIAN ASSISTANT

## 2019-05-06 PROCEDURE — 99213 OFFICE O/P EST LOW 20 MIN: CPT | Performed by: PHYSICIAN ASSISTANT

## 2019-05-06 RX ORDER — NYSTATIN 100000 U/G
CREAM TOPICAL
Qty: 30 G | Refills: 5 | Status: SHIPPED | OUTPATIENT
Start: 2019-05-06

## 2019-10-07 ENCOUNTER — OFFICE VISIT (OUTPATIENT)
Dept: FAMILY MEDICINE CLINIC | Facility: HOSPITAL | Age: 64
End: 2019-10-07
Payer: COMMERCIAL

## 2019-10-07 VITALS
WEIGHT: 111 LBS | DIASTOLIC BLOOD PRESSURE: 66 MMHG | RESPIRATION RATE: 16 BRPM | HEART RATE: 62 BPM | HEIGHT: 60 IN | SYSTOLIC BLOOD PRESSURE: 110 MMHG | BODY MASS INDEX: 21.79 KG/M2 | TEMPERATURE: 96.4 F

## 2019-10-07 DIAGNOSIS — J02.9 PHARYNGITIS, UNSPECIFIED ETIOLOGY: Primary | ICD-10-CM

## 2019-10-07 PROCEDURE — 99213 OFFICE O/P EST LOW 20 MIN: CPT | Performed by: PHYSICIAN ASSISTANT

## 2019-10-07 PROCEDURE — 3008F BODY MASS INDEX DOCD: CPT | Performed by: PHYSICIAN ASSISTANT

## 2019-10-07 RX ORDER — AMOXICILLIN AND CLAVULANATE POTASSIUM 875; 125 MG/1; MG/1
1 TABLET, FILM COATED ORAL EVERY 12 HOURS SCHEDULED
Qty: 20 TABLET | Refills: 0 | Status: SHIPPED | OUTPATIENT
Start: 2019-10-07 | End: 2020-12-10 | Stop reason: SDUPTHER

## 2019-10-07 RX ORDER — TRIAMCINOLONE ACETONIDE 55 UG/1
SPRAY, METERED NASAL
COMMUNITY
End: 2020-12-07 | Stop reason: ALTCHOICE

## 2019-10-07 RX ORDER — MONTELUKAST SODIUM 10 MG/1
TABLET ORAL
COMMUNITY
End: 2020-12-07 | Stop reason: ALTCHOICE

## 2019-10-07 RX ORDER — ASCORBIC ACID 500 MG
TABLET ORAL
COMMUNITY

## 2019-10-07 RX ORDER — CHLORAL HYDRATE 500 MG
CAPSULE ORAL
COMMUNITY
End: 2021-01-18 | Stop reason: ALTCHOICE

## 2019-10-07 RX ORDER — LEVOCETIRIZINE DIHYDROCHLORIDE 5 MG/1
TABLET, FILM COATED ORAL
COMMUNITY
End: 2019-10-07 | Stop reason: ALTCHOICE

## 2019-10-07 NOTE — PROGRESS NOTES
Assessment/Plan:         Diagnoses and all orders for this visit:    Pharyngitis, unspecified etiology  -     amoxicillin-clavulanate (AUGMENTIN) 875-125 mg per tablet; Take 1 tablet by mouth every 12 (twelve) hours for 10 days    Other orders  -     montelukast (SINGULAIR) 10 mg tablet; Take by mouth    Subjective:      Patient ID: Elli Lockhart is a 62 y o  female  62year old  female c/o chills, sore throat  and body aches [place]  Last week Saturday walked 10 miles, and weather was warmer  Saturday night started Rx  For Augmentin  875 mg, she bought in [place]  Saturday after walk, had body aches, muscles ached and had the sore throat, so started Augmentin in the evening  Review of Systems   Constitutional: Positive for chills and fatigue  Negative for diaphoresis and fever  HENT: Positive for congestion, postnasal drip and sore throat  Negative for ear pain and rhinorrhea  Has mucous in chest area, slightly  Respiratory: Negative for cough, chest tightness and shortness of breath  Skin: Positive for rash  Neurological: Positive for headaches  Negative for dizziness and light-headedness  Objective:      /66   Pulse 62   Temp (!) 96 4 °F (35 8 °C) (Tympanic)   Resp 16   Ht 5' (1 524 m)   Wt 50 3 kg (111 lb)   LMP  (LMP Unknown)   BMI 21 68 kg/m²          Physical Exam   Constitutional: She is oriented to person, place, and time  She appears well-developed and well-nourished  No distress  HENT:   Head: Normocephalic and atraumatic  Nose: Nose normal    Mouth/Throat: No oropharyngeal exudate  TM bulging and erythematous  Pharynx erythematous  Eyes: Conjunctivae and EOM are normal  Right eye exhibits no discharge  Left eye exhibits no discharge  No scleral icterus  Pulmonary/Chest: Effort normal and breath sounds normal  No stridor  No respiratory distress  She has no wheezes  She has no rales  Musculoskeletal: Normal range of motion  She exhibits no edema, tenderness or deformity  Neurological: She is alert and oriented to person, place, and time  No cranial nerve deficit  Coordination normal    Skin: She is not diaphoretic  Nursing note and vitals reviewed

## 2019-10-29 ENCOUNTER — TELEPHONE (OUTPATIENT)
Dept: FAMILY MEDICINE CLINIC | Facility: HOSPITAL | Age: 64
End: 2019-10-29

## 2019-10-29 NOTE — TELEPHONE ENCOUNTER
She received augmentin for 10 days that should be adequate, she needs to be re-evaluated to see what's going on before new round of abx

## 2019-10-29 NOTE — TELEPHONE ENCOUNTER
She said this happens when she doesn't get enough sleep and she would like to know if you are able to refill amoxicillin and then she said when she gets enough sleep the amoxillcin helps  Pt said she is really pressed for time to come in for an appt  Pt said you had been helping with her  Can you please address this? She uses [employer]  Pt wants to to get a handle on this before it gets worse  [phone]

## 2019-10-30 NOTE — TELEPHONE ENCOUNTER
Please call patient and explain too much antibiotics not good for your, try over the counter allergy meds  First, and wait few days, prior to starting antibiotics, or requesting antibiotics

## 2019-10-31 NOTE — TELEPHONE ENCOUNTER
PT called - she is gargling with warm salt water - will try OTC meds and getting cough drops  Will try this over the weekend and will call Tue/Wed  If not any better

## 2020-02-18 ENCOUNTER — TELEPHONE (OUTPATIENT)
Dept: GASTROENTEROLOGY | Facility: CLINIC | Age: 65
End: 2020-02-18

## 2020-02-18 NOTE — TELEPHONE ENCOUNTER
Received referral from pcp [facility] to call pt to sched colon ov notes and referral sent to [facility] 2/18/20  Pt not on bloodthinners

## 2020-02-24 ENCOUNTER — HOSPITAL ENCOUNTER (OUTPATIENT)
Dept: RADIOLOGY | Facility: HOSPITAL | Age: 65
Discharge: HOME | End: 2020-02-24
Payer: COMMERCIAL

## 2020-02-24 ENCOUNTER — TRANSCRIBE ORDERS (OUTPATIENT)
Dept: ADMINISTRATIVE | Facility: HOSPITAL | Age: 65
End: 2020-02-24

## 2020-02-24 DIAGNOSIS — R06.02 SHORTNESS OF BREATH: Primary | ICD-10-CM

## 2020-02-24 DIAGNOSIS — R06.02 SHORTNESS OF BREATH: ICD-10-CM

## 2020-02-24 PROCEDURE — 71046 X-RAY EXAM CHEST 2 VIEWS: CPT

## 2020-04-29 ENCOUNTER — TELEPHONE (OUTPATIENT)
Dept: GASTROENTEROLOGY | Facility: CLINIC | Age: 65
End: 2020-04-29

## 2020-07-10 ENCOUNTER — CLINICAL SUPPORT (OUTPATIENT)
Dept: GASTROENTEROLOGY | Facility: CLINIC | Age: 65
End: 2020-07-10

## 2020-07-10 VITALS — WEIGHT: 111 LBS | HEIGHT: 60 IN | BODY MASS INDEX: 21.79 KG/M2

## 2020-07-10 DIAGNOSIS — Z11.59 SCREENING FOR VIRAL DISEASE: ICD-10-CM

## 2020-07-10 DIAGNOSIS — Z12.11 ENCOUNTER FOR SCREENING COLONOSCOPY: Primary | ICD-10-CM

## 2020-07-10 PROCEDURE — U0003 INFECTIOUS AGENT DETECTION BY NUCLEIC ACID (DNA OR RNA); SEVERE ACUTE RESPIRATORY SYNDROME CORONAVIRUS 2 (SARS-COV-2) (CORONAVIRUS DISEASE [COVID-19]), AMPLIFIED PROBE TECHNIQUE, MAKING USE OF HIGH THROUGHPUT TECHNOLOGIES AS DESCRIBED BY CMS-2020-01-R: HCPCS

## 2020-07-10 NOTE — PROGRESS NOTES
Virtual colon prep  Dx: screening  Rx sent to provider for signature  Instructions for clenpiq given  Meds reviewed  Instructions emailed to pt  Covid testing done 7/10

## 2020-07-11 LAB
INPATIENT: NORMAL
SARS-COV-2 RNA SPEC QL NAA+PROBE: NOT DETECTED

## 2020-07-17 ENCOUNTER — ANESTHESIA EVENT (OUTPATIENT)
Dept: GASTROENTEROLOGY | Facility: AMBULATORY SURGERY CENTER | Age: 65
End: 2020-07-17

## 2020-07-17 ENCOUNTER — ANESTHESIA (OUTPATIENT)
Dept: GASTROENTEROLOGY | Facility: AMBULATORY SURGERY CENTER | Age: 65
End: 2020-07-17

## 2020-07-17 ENCOUNTER — HOSPITAL ENCOUNTER (OUTPATIENT)
Dept: GASTROENTEROLOGY | Facility: AMBULATORY SURGERY CENTER | Age: 65
Discharge: HOME | End: 2020-07-17
Payer: COMMERCIAL

## 2020-07-17 VITALS
OXYGEN SATURATION: 100 % | RESPIRATION RATE: 20 BRPM | SYSTOLIC BLOOD PRESSURE: 128 MMHG | HEART RATE: 53 BPM | TEMPERATURE: 97.8 F | DIASTOLIC BLOOD PRESSURE: 67 MMHG

## 2020-07-17 DIAGNOSIS — Z12.11 SCREENING FOR COLON CANCER: ICD-10-CM

## 2020-07-17 PROCEDURE — 88305 TISSUE EXAM BY PATHOLOGIST: CPT | Performed by: PATHOLOGY

## 2020-07-17 PROCEDURE — 45380 COLONOSCOPY AND BIOPSY: CPT | Performed by: INTERNAL MEDICINE

## 2020-07-17 RX ORDER — HYDRALAZINE HYDROCHLORIDE 20 MG/ML
5 INJECTION INTRAMUSCULAR; INTRAVENOUS
Status: DISCONTINUED | OUTPATIENT
Start: 2020-07-17 | End: 2020-07-21 | Stop reason: HOSPADM

## 2020-07-17 RX ORDER — FENTANYL CITRATE/PF 50 MCG/ML
12.5 SYRINGE (ML) INJECTION
Status: DISCONTINUED | OUTPATIENT
Start: 2020-07-17 | End: 2020-07-21 | Stop reason: HOSPADM

## 2020-07-17 RX ORDER — ONDANSETRON 2 MG/ML
4 INJECTION INTRAMUSCULAR; INTRAVENOUS ONCE AS NEEDED
Status: DISCONTINUED | OUTPATIENT
Start: 2020-07-17 | End: 2020-07-21 | Stop reason: HOSPADM

## 2020-07-17 RX ORDER — SODIUM CHLORIDE 9 MG/ML
75 INJECTION, SOLUTION INTRAVENOUS CONTINUOUS
Status: DISCONTINUED | OUTPATIENT
Start: 2020-07-17 | End: 2020-07-21 | Stop reason: HOSPADM

## 2020-07-17 RX ORDER — PROPOFOL 10 MG/ML
INJECTION, EMULSION INTRAVENOUS AS NEEDED
Status: DISCONTINUED | OUTPATIENT
Start: 2020-07-17 | End: 2020-07-17 | Stop reason: SURG

## 2020-07-17 RX ORDER — PROMETHAZINE HYDROCHLORIDE 25 MG/ML
6.25 INJECTION, SOLUTION INTRAMUSCULAR; INTRAVENOUS ONCE AS NEEDED
Status: DISCONTINUED | OUTPATIENT
Start: 2020-07-17 | End: 2020-07-21 | Stop reason: HOSPADM

## 2020-07-17 RX ORDER — LABETALOL 20 MG/4 ML (5 MG/ML) INTRAVENOUS SYRINGE
5
Status: DISCONTINUED | OUTPATIENT
Start: 2020-07-17 | End: 2020-07-21 | Stop reason: HOSPADM

## 2020-07-17 RX ORDER — MEPERIDINE HYDROCHLORIDE 50 MG/ML
12.5 INJECTION INTRAMUSCULAR; INTRAVENOUS; SUBCUTANEOUS
Status: DISCONTINUED | OUTPATIENT
Start: 2020-07-17 | End: 2020-07-21 | Stop reason: HOSPADM

## 2020-07-17 RX ORDER — METOCLOPRAMIDE HYDROCHLORIDE 5 MG/ML
10 INJECTION INTRAMUSCULAR; INTRAVENOUS ONCE AS NEEDED
Status: DISCONTINUED | OUTPATIENT
Start: 2020-07-17 | End: 2020-07-21 | Stop reason: HOSPADM

## 2020-07-17 RX ADMIN — PROPOFOL 20 MG: 10 INJECTION, EMULSION INTRAVENOUS at 11:25

## 2020-07-17 RX ADMIN — PROPOFOL 20 MG: 10 INJECTION, EMULSION INTRAVENOUS at 11:20

## 2020-07-17 RX ADMIN — PROPOFOL 20 MG: 10 INJECTION, EMULSION INTRAVENOUS at 11:23

## 2020-07-17 RX ADMIN — PROPOFOL 20 MG: 10 INJECTION, EMULSION INTRAVENOUS at 11:18

## 2020-07-17 RX ADMIN — PROPOFOL 60 MG: 10 INJECTION, EMULSION INTRAVENOUS at 11:16

## 2020-07-17 RX ADMIN — PROPOFOL 20 MG: 10 INJECTION, EMULSION INTRAVENOUS at 11:33

## 2020-07-17 RX ADMIN — PROPOFOL 20 MG: 10 INJECTION, EMULSION INTRAVENOUS at 11:27

## 2020-07-17 RX ADMIN — PROPOFOL 20 MG: 10 INJECTION, EMULSION INTRAVENOUS at 11:30

## 2020-07-17 RX ADMIN — SODIUM CHLORIDE 75 ML/HR: 9 INJECTION, SOLUTION INTRAVENOUS at 11:05

## 2020-07-17 NOTE — H&P
History and Physical - [facility]  Elgin Zendejas 60 y o  female MRN: [id number]    HPI: Elgin Zendejas is a 60y o  year old female who presents for average risk screening colonoscopy    REVIEW OF SYSTEMS: Per the HPI, and otherwise unremarkable      Historical Information   Past Medical History:   Diagnosis Date    Decreased bone density     Low back pain radiating down leg     upper back  pain also    Organ donor      Past Surgical History:   Procedure Laterality Date    BILATERAL OOPHORECTOMY      COLONOSCOPY      HYSTERECTOMY       Social History   Social History     Substance and Sexual Activity   Alcohol Use No     Social History     Substance and Sexual Activity   Drug Use No     Social History     Tobacco Use   Smoking Status Never Smoker   Smokeless Tobacco Never Used     Family History   Problem Relation Age of Onset    Uterine cancer Mother     Cancer Brother         Bile duct     Stroke Brother     Other Brother         cerebrovascular arteroisclerosis    Substance Abuse Neg Hx         neg fam hx    Mental illness Neg Hx         neg fam hx    Colon polyps Neg Hx     Colon cancer Neg Hx        Meds/Allergies       Current Outpatient Medications:     ascorbic acid (VITAMIN C) 500 mg tablet    azelastine (ASTELIN) 0 1 % nasal spray    B Complex Vitamins (VITAMIN B COMPLEX PO)    Cetirizine HCl 10 MG CAPS    fluticasone (FLONASE) 50 mcg/act nasal spray    Sod Picosulfate-Mag Ox-Cit Acd (Clenpiq) 10-3 5-12 MG-GM -GM/160ML SOLN    celecoxib (CeleBREX) 100 mg capsule    estazolam (PROSOM) 1 MG tablet    fluocinonide (LIDEX) 0 05 % cream    gabapentin (NEURONTIN) 100 mg capsule    montelukast (SINGULAIR) 10 mg tablet    nystatin (MYCOSTATIN) cream    Omega-3 Fatty Acids (FISH OIL) 1,000 mg    Triamcinolone Acetonide 55 MCG/ACT AERO    Current Facility-Administered Medications:     sodium chloride 0 9 % infusion, 75 mL/hr, Intravenous, Continuous    Allergies Allergen Reactions    Latex Itching and Rash       Objective     /68   Pulse 57   Temp 97 8 °F (36 6 °C) (Temporal)   Resp 18   LMP  (LMP Unknown)   SpO2 96%     PHYSICAL EXAM    Gen: NAD AAOx3  CV: S1S2 RRR no m/r/g  CHEST: Clear b/l no c/r/w  ABD: soft, +BS NT/ND  EXT: no edema    ASSESSMENT/PLAN:  This is a 60y o  year old female here for average risk screening colonoscopy, and she is stable and optimized for her procedure

## 2020-07-17 NOTE — ANESTHESIA PREPROCEDURE EVALUATION
Review of Systems/Medical History  Patient summary reviewed  Chart reviewed      Cardiovascular  Negative cardio ROS Exercise tolerance (METS): >4,     Pulmonary  Negative pulmonary ROS Not a smoker ,        GI/Hepatic    Bowel prep            Endo/Other  Negative endo/other ROS      GYN  Negative gynecology ROS   Hysterectomy,        Hematology  Negative hematology ROS      Musculoskeletal  Back pain ,   Arthritis     Neurology  Negative neurology ROS      Psychology   Negative psychology ROS              Physical Exam    Airway    Mallampati score: II  TM Distance: >3 FB  Neck ROM: full     Dental   No notable dental hx     Cardiovascular  Comment: Negative ROS, Rhythm: regular, Rate: normal, Cardiovascular exam normal    Pulmonary  Pulmonary exam normal Breath sounds clear to auscultation,     Other Findings        Anesthesia Plan  ASA Score- 1     Anesthesia Type- IV sedation with anesthesia with ASA Monitors  Additional Monitors:   Airway Plan:         Plan Factors-    Induction-     Postoperative Plan-     Informed Consent- Anesthetic plan and risks discussed with patient

## 2020-07-17 NOTE — DISCHARGE INSTRUCTIONS
Colorectal Polyps   WHAT YOU NEED TO KNOW:   What are colorectal polyps? Colorectal polyps are small growths of tissue in the lining of the colon and rectum  Most polyps are hyperplastic polyps and are usually benign (noncancerous)  Certain types of polyps, called adenomatous polyps, may turn into cancer  What increases my risk of colorectal polyps? The exact cause of colorectal polyps is unknown  The following may increase your risk:  · Older age    · A diet of foods high in fat and low in fiber     · Family history of polyps    · Intestinal diseases, such as Crohn's disease or ulcerative colitis    · An unhealthy lifestyle, such as physical inactivity, smoking, or drinking alcohol    · Obesity  What are the signs and symptoms of colorectal polyps? · Blood in your bowel movement or bleeding from the rectum    · Change in bowel movement habits, such as diarrhea and constipation    · Abdominal pain  How are colorectal polyps diagnosed? You should have fecal blood screening once a year for colorectal disease if you are over 48years old  You should be screened earlier if you have an intestinal disease or a family history of polyps or colorectal cancer  During this screening, a sample of your bowel movement is checked for blood, which may be an early sign of colorectal polyps or cancer  You may also need any of the following tests:  · Digital rectal exam:  Your healthcare provider will examine your anus and use a finger to check your rectum for polyps  · Barium enema: A barium enema is an x-ray of the colon  A tube is put into your anus, and a liquid called barium is put through the tube  Barium is used so that caregivers can see your colon better on the x-ray film  · Virtual colonoscopy: This is a CT scan that takes pictures of the inside of your colon and rectum  A small, flexible tube is put into your rectum and air or carbon dioxide (gas) is used to expand your colon   This lets healthcare providers clearly see your colon and any polyps on a monitor  · Colonoscopy or sigmoidoscopy: These procedures help your healthcare provider see the inside of your colon using a flexible tube with a small light and camera on the end  During a sigmoidoscopy, your healthcare provider will only look at rectum and lower colon  During a colonoscopy, healthcare providers will look at the full length of your colon  Healthcare providers may remove a small amount of tissue from the colon for a biopsy  How are colorectal polyps treated? · Polyp removal:  Polyps may be removed during your sigmoidoscopy or colonoscopy  · Polypectomy: This is surgery to remove your polyps  You may need laparoscopic or open surgery, depending on the type, size, and number of polyps that you have  Laparoscopy is done by inserting a small, flexible scope into incisions made on your abdomen  Open surgery is done by making a larger incision on your abdomen   What are the risks of colorectal polyps? You may bleed during a colonoscopy procedure  Your bowel may be perforated (torn) when polyps are removed  This may lead to an open abdominal surgery  During surgery, you may bleed too much or get an infection  Adenomatous polyps that are not removed may turn into cancer and become more difficult to treat  Where can I find support and more information? · [facility] ([facility])  [address], [place] [address]  Phone: [phone]  Web Address: Kong Choe  [facility] [employer] go[employer]  When should I contact my healthcare provider? · You have a fever  · You have chills, a cough, or feel weak and achy  · You have abdominal pain that does not go away or gets worse after you take medicine  · Your abdomen is swollen  · You are losing weight without trying  · You have questions or concerns about your condition or care  When should I seek immediate care or call 911?    · You have sudden shortness of breath  · You have a fast heart rate, fast breathing, or are too dizzy to stand up  · You have severe abdominal pain  · You see blood in your bowel movement  CARE AGREEMENT:   You have the right to help plan your care  Learn about your health condition and how it may be treated  Discuss treatment options with your caregivers to decide what care you want to receive  You always have the right to refuse treatment  The above information is an  only  It is not intended as medical advice for individual conditions or treatments  Talk to your doctor, nurse or pharmacist before following any medical regimen to see if it is safe and effective for you  © 2017 [address]  Information is for End User's use only and may not be sold, redistributed or otherwise used for commercial purposes  All illustrations and images included in CareNotes® are the copyrighted property of A D A M [employer]  or [employer]em Ziegler  Hemorrhoids   WHAT YOU NEED TO KNOW:   What are hemorrhoids? Hemorrhoids are swollen blood vessels inside your rectum (internal hemorrhoids) or on your anus (external hemorrhoids)  Sometimes a hemorrhoid may prolapse  This means it extends out of your anus  What increases my risk for hemorrhoids? · Pregnancy or obesity    · Straining or sitting for a long time during bowel movements    · Liver disease    · Weak muscles around the anus caused by older age, rectal surgery, or anal intercourse    · A lack of physical activity    · Chronic diarrhea or constipation    · A low-fiber diet  What are the signs and symptoms of hemorrhoids?    · Pain or itching around your anus or inside your rectum    · Swelling or bumps around your anus    · Bright red blood in your bowel movement, on the toilet paper, or in the toilet bowl    · Tissue bulging out of your anus (prolapsed hemorrhoids)    · Incontinence (poor control over urine or bowel movements)  How are hemorrhoids diagnosed? Your healthcare provider will ask about your symptoms, the foods you eat, and your bowel movements  He will examine your anus for external hemorrhoids  You may need the following:  · A digital rectal exam  is a test to check for hemorrhoids  Your healthcare provider will put a gloved finger inside your anus to feel for the hemorrhoids  · An anoscopy  is a test that uses a scope (small tube with a light and camera on the end) to look at your hemorrhoids  How are hemorrhoids treated? Treatment will depend on your symptoms  You may need any of the following:  · Medicines  can help decrease pain and swelling, and soften your bowel movement  The medicine may be a pill, pad, cream, or ointment  · Procedures  may be used to shrink or remove your hemorrhoid  Examples include rubber-band ligation, sclerotherapy, and photocoagulation  These procedures may be done in your healthcare provider's office  Ask your healthcare provider for more information about these procedures  · Surgery  may be needed to shrink or remove your hemorrhoids  How can I manage my symptoms? · Apply ice on your anus for 15 to 20 minutes every hour or as directed  Use an ice pack, or put crushed ice in a plastic bag  Cover it with a towel before you apply it to your anus  Ice helps prevent tissue damage and decreases swelling and pain  · Take a sitz bath  Fill a bathtub with 4 to 6 inches of warm water  You may also use a sitz bath pan that fits inside a toilet bowl  Sit in the sitz bath for 15 minutes  Do this 3 times a day, and after each bowel movement  The warm water can help decrease pain and swelling  · Keep your anal area clean  Gently wash the area with warm water daily  Soap may irritate the area  After a bowel movement, wipe with moist towelettes or wet toilet paper  Dry toilet paper can irritate the area  How can I help prevent hemorrhoids?    · Do not strain to have a bowel movement  Do not sit on the toilet too long  These actions can increase pressure on the tissues in your rectum and anus  · Drink plenty of liquids  Liquids can help prevent constipation  Ask how much liquid to drink each day and which liquids are best for you  · Eat a variety of high-fiber foods  Examples include fruits, vegetables, and whole grains  Ask your healthcare provider how much fiber you need each day  You may need to take a fiber supplement  · Exercise as directed  Exercise, such as walking, may make it easier to have a bowel movement  Ask your healthcare provider to help you create an exercise plan  · Do not have anal sex  Anal sex can weaken the skin around your rectum and anus  · Avoid heavy lifting  This can cause straining and increase your risk for another hemorrhoid  When should I seek immediate care? · You have severe pain in your rectum or around your anus  · You have severe pain in your abdomen and you are vomiting  · You have bleeding from your anus that soaks through your underwear  When should I contact my healthcare provider? · You have frequent and painful bowel movements  · Your hemorrhoid looks or feels more swollen than usual      · You do not have a bowel movement for 2 days or more  · You see or feel tissue coming through your anus  · You have questions or concerns about your condition or care  CARE AGREEMENT:   You have the right to help plan your care  Learn about your health condition and how it may be treated  Discuss treatment options with your caregivers to decide what care you want to receive  You always have the right to refuse treatment  The above information is an  only  It is not intended as medical advice for individual conditions or treatments  Talk to your doctor, nurse or pharmacist before following any medical regimen to see if it is safe and effective for you    © 2017 [facility] Information is for End User's use only and may not be sold, redistributed or otherwise used for commercial purposes  All illustrations and images included in CareNotes® are the copyrighted property of A D A M , Inc  or [employer]em Ziegler

## 2020-07-17 NOTE — ANESTHESIA POSTPROCEDURE EVALUATION
Post-Op Assessment Note    CV Status:  Stable  Pain Score: 0    Pain management: adequate     Mental Status:  Sleepy and awake   Hydration Status:  Stable   PONV Controlled:  None   Airway Patency:  Patent and adequate   Post Op Vitals Reviewed: Yes      Staff: Anesthesiologist           BP      Temp      Pulse     Resp      SpO2

## 2020-12-07 ENCOUNTER — TELEMEDICINE (OUTPATIENT)
Dept: FAMILY MEDICINE CLINIC | Facility: HOSPITAL | Age: 65
End: 2020-12-07
Payer: COMMERCIAL

## 2020-12-07 VITALS — BODY MASS INDEX: 21.79 KG/M2 | WEIGHT: 111 LBS | HEIGHT: 60 IN | TEMPERATURE: 99 F

## 2020-12-07 DIAGNOSIS — B35.3 TINEA PEDIS OF RIGHT FOOT: ICD-10-CM

## 2020-12-07 DIAGNOSIS — M51.36 DEGENERATIVE DISC DISEASE, LUMBAR: ICD-10-CM

## 2020-12-07 DIAGNOSIS — J06.9 VIRAL UPPER RESPIRATORY TRACT INFECTION: Primary | ICD-10-CM

## 2020-12-07 DIAGNOSIS — R53.83 FATIGUE, UNSPECIFIED TYPE: ICD-10-CM

## 2020-12-07 DIAGNOSIS — G47.9 SLEEP DISTURBANCE: ICD-10-CM

## 2020-12-07 PROCEDURE — 99213 OFFICE O/P EST LOW 20 MIN: CPT | Performed by: PHYSICIAN ASSISTANT

## 2020-12-07 PROCEDURE — 3008F BODY MASS INDEX DOCD: CPT | Performed by: PHYSICIAN ASSISTANT

## 2020-12-07 RX ORDER — GABAPENTIN 100 MG/1
100 CAPSULE ORAL 3 TIMES DAILY
Qty: 90 CAPSULE | Refills: 3 | Status: SHIPPED | OUTPATIENT
Start: 2020-12-07

## 2020-12-07 RX ORDER — ESTAZOLAM 1 MG
1 TABLET ORAL
Qty: 30 TABLET | Refills: 3 | OUTPATIENT
Start: 2020-12-07 | End: 2021-01-18 | Stop reason: SDUPTHER

## 2020-12-08 DIAGNOSIS — J06.9 VIRAL UPPER RESPIRATORY TRACT INFECTION: ICD-10-CM

## 2020-12-08 PROCEDURE — U0003 INFECTIOUS AGENT DETECTION BY NUCLEIC ACID (DNA OR RNA); SEVERE ACUTE RESPIRATORY SYNDROME CORONAVIRUS 2 (SARS-COV-2) (CORONAVIRUS DISEASE [COVID-19]), AMPLIFIED PROBE TECHNIQUE, MAKING USE OF HIGH THROUGHPUT TECHNOLOGIES AS DESCRIBED BY CMS-2020-01-R: HCPCS | Performed by: PHYSICIAN ASSISTANT

## 2020-12-09 LAB — SARS-COV-2 RNA SPEC QL NAA+PROBE: DETECTED

## 2020-12-10 ENCOUNTER — TELEPHONE (OUTPATIENT)
Dept: FAMILY MEDICINE CLINIC | Facility: HOSPITAL | Age: 65
End: 2020-12-10

## 2020-12-10 DIAGNOSIS — J02.9 PHARYNGITIS, UNSPECIFIED ETIOLOGY: ICD-10-CM

## 2020-12-10 RX ORDER — AMOXICILLIN AND CLAVULANATE POTASSIUM 875; 125 MG/1; MG/1
1 TABLET, FILM COATED ORAL EVERY 12 HOURS SCHEDULED
Qty: 20 TABLET | Refills: 0 | Status: SHIPPED | OUTPATIENT
Start: 2020-12-10 | End: 2021-01-20 | Stop reason: SDUPTHER

## 2020-12-10 RX ORDER — HALOBETASOL PROPIONATE 0.1 MG/G
LOTION TOPICAL
COMMUNITY
Start: 2020-10-16

## 2020-12-10 RX ORDER — AZELASTINE HYDROCHLORIDE 0.5 MG/ML
SOLUTION/ DROPS OPHTHALMIC
COMMUNITY
Start: 2020-09-04

## 2020-12-14 ENCOUNTER — TELEMEDICINE (OUTPATIENT)
Dept: FAMILY MEDICINE CLINIC | Facility: HOSPITAL | Age: 65
End: 2020-12-14
Payer: COMMERCIAL

## 2020-12-14 VITALS — TEMPERATURE: 99.7 F | BODY MASS INDEX: 21.68 KG/M2 | WEIGHT: 111 LBS

## 2020-12-14 DIAGNOSIS — U07.1 LAB TEST POSITIVE FOR DETECTION OF COVID-19 VIRUS: Primary | ICD-10-CM

## 2020-12-14 PROCEDURE — 99213 OFFICE O/P EST LOW 20 MIN: CPT | Performed by: PHYSICIAN ASSISTANT

## 2020-12-14 PROCEDURE — 1036F TOBACCO NON-USER: CPT | Performed by: PHYSICIAN ASSISTANT

## 2020-12-16 ENCOUNTER — TELEPHONE (OUTPATIENT)
Dept: FAMILY MEDICINE CLINIC | Facility: HOSPITAL | Age: 65
End: 2020-12-16

## 2020-12-16 DIAGNOSIS — N39.0 URINARY TRACT INFECTION WITHOUT HEMATURIA, SITE UNSPECIFIED: Primary | ICD-10-CM

## 2020-12-16 RX ORDER — CIPROFLOXACIN 250 MG/1
250 TABLET, FILM COATED ORAL EVERY 12 HOURS SCHEDULED
Qty: 6 TABLET | Refills: 0 | Status: SHIPPED | OUTPATIENT
Start: 2020-12-16 | End: 2020-12-18 | Stop reason: SDUPTHER

## 2020-12-18 ENCOUNTER — TELEPHONE (OUTPATIENT)
Dept: FAMILY MEDICINE CLINIC | Facility: HOSPITAL | Age: 65
End: 2020-12-18

## 2020-12-18 DIAGNOSIS — N39.0 URINARY TRACT INFECTION WITHOUT HEMATURIA, SITE UNSPECIFIED: ICD-10-CM

## 2020-12-18 RX ORDER — CIPROFLOXACIN 250 MG/1
250 TABLET, FILM COATED ORAL EVERY 12 HOURS SCHEDULED
Qty: 14 TABLET | Refills: 0 | Status: SHIPPED | OUTPATIENT
Start: 2020-12-18 | End: 2020-12-25

## 2020-12-22 ENCOUNTER — TELEPHONE (OUTPATIENT)
Dept: UROLOGY | Facility: MEDICAL CENTER | Age: 65
End: 2020-12-22

## 2020-12-22 ENCOUNTER — TELEPHONE (OUTPATIENT)
Dept: FAMILY MEDICINE CLINIC | Facility: HOSPITAL | Age: 65
End: 2020-12-22

## 2020-12-23 ENCOUNTER — TELEMEDICINE (OUTPATIENT)
Dept: FAMILY MEDICINE CLINIC | Facility: HOSPITAL | Age: 65
End: 2020-12-23
Payer: COMMERCIAL

## 2020-12-23 VITALS — HEIGHT: 60 IN | WEIGHT: 111 LBS | BODY MASS INDEX: 21.79 KG/M2

## 2020-12-23 DIAGNOSIS — R53.83 FATIGUE, UNSPECIFIED TYPE: ICD-10-CM

## 2020-12-23 DIAGNOSIS — R06.02 SOB (SHORTNESS OF BREATH): ICD-10-CM

## 2020-12-23 DIAGNOSIS — N39.0 URINARY TRACT INFECTION WITHOUT HEMATURIA, SITE UNSPECIFIED: ICD-10-CM

## 2020-12-23 DIAGNOSIS — N39.0 URINARY TRACT INFECTION WITHOUT HEMATURIA, SITE UNSPECIFIED: Primary | ICD-10-CM

## 2020-12-23 DIAGNOSIS — N60.91 ATYPICAL DUCTAL HYPERPLASIA OF BOTH BREASTS: ICD-10-CM

## 2020-12-23 DIAGNOSIS — N60.92 ATYPICAL DUCTAL HYPERPLASIA OF BOTH BREASTS: ICD-10-CM

## 2020-12-23 DIAGNOSIS — Z86.19 HISTORY OF HEPATITIS B: ICD-10-CM

## 2020-12-23 DIAGNOSIS — Z00.00 HEALTHCARE MAINTENANCE: ICD-10-CM

## 2020-12-23 DIAGNOSIS — N39.0 RECURRENT UTI: Primary | ICD-10-CM

## 2020-12-23 PROCEDURE — 99214 OFFICE O/P EST MOD 30 MIN: CPT | Performed by: PHYSICIAN ASSISTANT

## 2020-12-23 RX ORDER — ALBUTEROL SULFATE 90 UG/1
2 AEROSOL, METERED RESPIRATORY (INHALATION) EVERY 6 HOURS PRN
Qty: 1 INHALER | Refills: 5 | Status: SHIPPED | OUTPATIENT
Start: 2020-12-23

## 2020-12-23 RX ORDER — CIPROFLOXACIN 500 MG/1
500 TABLET, FILM COATED ORAL EVERY 12 HOURS SCHEDULED
Qty: 14 TABLET | Refills: 0 | Status: SHIPPED | OUTPATIENT
Start: 2020-12-23 | End: 2021-02-03 | Stop reason: SDUPTHER

## 2020-12-24 ENCOUNTER — PATIENT MESSAGE (OUTPATIENT)
Dept: FAMILY MEDICINE CLINIC | Facility: HOSPITAL | Age: 65
End: 2020-12-24

## 2020-12-28 ENCOUNTER — TELEPHONE (OUTPATIENT)
Dept: FAMILY MEDICINE CLINIC | Facility: HOSPITAL | Age: 65
End: 2020-12-28

## 2020-12-30 ENCOUNTER — TELEMEDICINE (OUTPATIENT)
Dept: FAMILY MEDICINE CLINIC | Facility: HOSPITAL | Age: 65
End: 2020-12-30
Payer: COMMERCIAL

## 2020-12-30 VITALS
TEMPERATURE: 96.7 F | BODY MASS INDEX: 21.79 KG/M2 | HEIGHT: 60 IN | OXYGEN SATURATION: 99 % | WEIGHT: 111 LBS | HEART RATE: 80 BPM

## 2020-12-30 DIAGNOSIS — R35.0 URINARY FREQUENCY: ICD-10-CM

## 2020-12-30 DIAGNOSIS — R10.2 PELVIC PAIN: Primary | ICD-10-CM

## 2020-12-30 PROCEDURE — 1036F TOBACCO NON-USER: CPT | Performed by: PHYSICIAN ASSISTANT

## 2020-12-30 PROCEDURE — 3008F BODY MASS INDEX DOCD: CPT | Performed by: PHYSICIAN ASSISTANT

## 2020-12-30 PROCEDURE — 99214 OFFICE O/P EST MOD 30 MIN: CPT | Performed by: PHYSICIAN ASSISTANT

## 2020-12-30 RX ORDER — MIRABEGRON 25 MG/1
25 TABLET, FILM COATED, EXTENDED RELEASE ORAL DAILY
Qty: 14 TABLET | Refills: 3 | Status: SHIPPED | OUTPATIENT
Start: 2020-12-30 | End: 2021-02-03 | Stop reason: CLARIF

## 2021-01-02 ENCOUNTER — HOSPITAL ENCOUNTER (OUTPATIENT)
Dept: ULTRASOUND IMAGING | Facility: HOSPITAL | Age: 66
Discharge: HOME | End: 2021-01-02
Payer: COMMERCIAL

## 2021-01-02 DIAGNOSIS — R35.0 URINARY FREQUENCY: ICD-10-CM

## 2021-01-02 DIAGNOSIS — R10.2 PELVIC PAIN: ICD-10-CM

## 2021-01-02 PROCEDURE — 76770 US EXAM ABDO BACK WALL COMP: CPT

## 2021-01-04 LAB
ALBUMIN SERPL-MCNC: 4.2 G/DL (ref 3.6–5.1)
ALBUMIN/GLOB SERPL: 1.4 (CALC) (ref 1–2.5)
ALP SERPL-CCNC: 59 U/L (ref 37–153)
ALT SERPL-CCNC: 11 U/L (ref 6–29)
AST SERPL-CCNC: 17 U/L (ref 10–35)
BASOPHILS # BLD AUTO: 79 CELLS/UL (ref 0–200)
BASOPHILS NFR BLD AUTO: 1.2 %
BILIRUB SERPL-MCNC: 0.7 MG/DL (ref 0.2–1.2)
BUN SERPL-MCNC: 16 MG/DL (ref 7–25)
BUN/CREAT SERPL: NORMAL (CALC) (ref 6–22)
CALCIUM SERPL-MCNC: 9.3 MG/DL (ref 8.6–10.4)
CHLORIDE SERPL-SCNC: 103 MMOL/L (ref 98–110)
CHOLEST SERPL-MCNC: 172 MG/DL
CHOLEST/HDLC SERPL: 2.6 (CALC)
CO2 SERPL-SCNC: 27 MMOL/L (ref 20–32)
CREAT SERPL-MCNC: 0.9 MG/DL (ref 0.5–0.99)
EOSINOPHIL # BLD AUTO: 112 CELLS/UL (ref 15–500)
EOSINOPHIL NFR BLD AUTO: 1.7 %
ERYTHROCYTE [DISTWIDTH] IN BLOOD BY AUTOMATED COUNT: 12.5 % (ref 11–15)
GLOBULIN SER CALC-MCNC: 2.9 G/DL (CALC) (ref 1.9–3.7)
GLUCOSE SERPL-MCNC: 90 MG/DL (ref 65–99)
HBV SURFACE AB SERPL IA-ACNC: 12 MIU/ML
HCT VFR BLD AUTO: 39 % (ref 35–45)
HCV AB S/CO SERPL IA: 0.04
HCV AB SERPL QL IA: NORMAL
HDLC SERPL-MCNC: 67 MG/DL
HGB BLD-MCNC: 13.1 G/DL (ref 11.7–15.5)
LDLC SERPL CALC-MCNC: 86 MG/DL (CALC)
LYMPHOCYTES # BLD AUTO: 2218 CELLS/UL (ref 850–3900)
LYMPHOCYTES NFR BLD AUTO: 33.6 %
MCH RBC QN AUTO: 30.1 PG (ref 27–33)
MCHC RBC AUTO-ENTMCNC: 33.6 G/DL (ref 32–36)
MCV RBC AUTO: 89.7 FL (ref 80–100)
MONOCYTES # BLD AUTO: 713 CELLS/UL (ref 200–950)
MONOCYTES NFR BLD AUTO: 10.8 %
NEUTROPHILS # BLD AUTO: 3478 CELLS/UL (ref 1500–7800)
NEUTROPHILS NFR BLD AUTO: 52.7 %
NONHDLC SERPL-MCNC: 105 MG/DL (CALC)
PLATELET # BLD AUTO: 241 THOUSAND/UL (ref 140–400)
PMV BLD REES-ECKER: 9.8 FL (ref 7.5–12.5)
POTASSIUM SERPL-SCNC: 4.1 MMOL/L (ref 3.5–5.3)
PROT SERPL-MCNC: 7.1 G/DL (ref 6.1–8.1)
RBC # BLD AUTO: 4.35 MILLION/UL (ref 3.8–5.1)
SL AMB EGFR AFRICAN AMERICAN: 78 ML/MIN/1.73M2
SL AMB EGFR NON AFRICAN AMERICAN: 68 ML/MIN/1.73M2
SODIUM SERPL-SCNC: 137 MMOL/L (ref 135–146)
TRIGL SERPL-MCNC: 94 MG/DL
TSH SERPL-ACNC: 1.37 MIU/L (ref 0.4–4.5)
WBC # BLD AUTO: 6.6 THOUSAND/UL (ref 3.8–10.8)

## 2021-01-05 ENCOUNTER — TELEMEDICINE (OUTPATIENT)
Dept: FAMILY MEDICINE CLINIC | Facility: HOSPITAL | Age: 66
End: 2021-01-05
Payer: COMMERCIAL

## 2021-01-05 DIAGNOSIS — R50.9 FEVER, UNSPECIFIED FEVER CAUSE: ICD-10-CM

## 2021-01-05 DIAGNOSIS — R50.9 FEVER, UNSPECIFIED FEVER CAUSE: Primary | ICD-10-CM

## 2021-01-05 DIAGNOSIS — R35.0 URINARY FREQUENCY: ICD-10-CM

## 2021-01-05 PROCEDURE — 99213 OFFICE O/P EST LOW 20 MIN: CPT | Performed by: INTERNAL MEDICINE

## 2021-01-05 PROCEDURE — 87637 SARSCOV2&INF A&B&RSV AMP PRB: CPT | Performed by: INTERNAL MEDICINE

## 2021-01-06 ENCOUNTER — TELEMEDICINE (OUTPATIENT)
Dept: FAMILY MEDICINE CLINIC | Facility: HOSPITAL | Age: 66
End: 2021-01-06
Payer: COMMERCIAL

## 2021-01-06 VITALS
TEMPERATURE: 100.2 F | OXYGEN SATURATION: 97 % | HEART RATE: 115 BPM | WEIGHT: 111 LBS | HEIGHT: 60 IN | BODY MASS INDEX: 21.79 KG/M2

## 2021-01-06 DIAGNOSIS — R50.9 FEVER, UNSPECIFIED FEVER CAUSE: ICD-10-CM

## 2021-01-06 DIAGNOSIS — R06.00 DYSPNEA ON EXERTION: Primary | ICD-10-CM

## 2021-01-06 LAB
FLUAV RNA NPH QL NAA+PROBE: NOT DETECTED
FLUBV RNA NPH QL NAA+PROBE: NOT DETECTED
RSV RNA NPH QL NAA+PROBE: NOT DETECTED
SARS-COV-2 RNA NPH QL NAA+PROBE: NOT DETECTED

## 2021-01-06 PROCEDURE — 99214 OFFICE O/P EST MOD 30 MIN: CPT | Performed by: INTERNAL MEDICINE

## 2021-01-06 NOTE — LETTER
January 6, 2021     Patient: Faith Kimble   YOB: 1955   Date of Visit: 1/6/2021       To Whom it May Concern:    Faith Kimble is under my professional care  She had a virtual video appointment on 1/6/2021  She may return to work on 01/11/2021  If you have any questions or concerns, please don't hesitate to call           Sincerely,          Dami Castaneda MD        CC: No Recipients

## 2021-01-06 NOTE — PROGRESS NOTES
Virtual Regular Visit      Assessment/Plan:    Problem List Items Addressed This Visit     None      Visit Diagnoses     Dyspnea on exertion    -  Primary    Relevant Orders    XR chest pa & lateral    Fever, unspecified fever cause        Relevant Orders    CBC and differential    Comprehensive metabolic panel          Will get chest x-ray to see if patient developed pneumonia, possibly bacterial after recovering from COVID infection  I told patient to get the urinalysis done to see if she has a urinary tract infection which would be unlikely after receiving at least 2 courses of antibiotics  Will also check CBC and CMP to see if she has any signs of systemic infection since she developed new onset of fevers, chills, weakness on January 4th  I advised patient not to go to work the rest of this week, rest, maintain hydration         Reason for visit is   Chief Complaint   Patient presents with    Virtual Regular Visit    Virtual Regular Visit        Encounter provider Hung Steven MD    Provider located at [address] [place] [address]      Recent Visits  Date Type Provider Dept   01/05/21 Telemedicine Romeo Zhou MD [address] [facility]   12/30/20 Telemedicine [facility]   Showing recent visits within past 7 days and meeting all other requirements     Today's Visits  Date Type Provider Dept   01/06/21 Telemedicine MD Jose Daniel Gan [facility]   Showing today's visits and meeting all other requirements     Future Appointments  No visits were found meeting these conditions  Showing future appointments within next 150 days and meeting all other requirements        The patient was identified by name and date of birth   Yoel Trujillo was informed that this is a telemedicine visit and that the visit is being conducted through [employer] and patient was informed that this is not a secure, HIPAA-compliant platform  She agrees to proceed     My office door was closed  No one else was in the room  She acknowledged consent and understanding of privacy and security of the video platform  The patient has agreed to participate and understands they can discontinue the visit at any time  Patient is aware this is a billable service  Kalyn Garrett is a 60 y o  female fevers, chills, weakness, shortness of breath, urinary urgency   Patient was diagnosed with COVID-19 infection on December 8th  Her symptoms got better  Subsequently she developed urinary frequency, urgency and was treated with at least 2 courses of ciprofloxacin antibiotics  She had persistent symptoms of urinary urgency and having to push when urinating therefore urine cultures were done that came back showing no growth on December 28th  She denies nausea, abdominal pain, flank tenderness  She still has persistent urinary urgency and feeling of having to push to urinate  She had a renal and bladder ultrasound with postvoid residual that showed a simple cyst, no hydronephrosis and no urinary retention  On January 4th patient developed fevers as high as 100 2, chills, shortness of breath and generalized weakness  She feels that she needs to work hard to breathe especially when walking up a hill  She denies any chest pain, pleurisy, hemoptysis, swollen legs  COVID test was ordered yesterday that came back negative and urinalysis was also ordered that has not been done yet         Past Medical History:   Diagnosis Date    Decreased bone density     Low back pain radiating down leg     upper back  pain also    Organ donor        Past Surgical History:   Procedure Laterality Date    BILATERAL OOPHORECTOMY      COLONOSCOPY      HYSTERECTOMY         Current Outpatient Medications   Medication Sig Dispense Refill    albuterol (PROVENTIL HFA,VENTOLIN HFA) 90 mcg/act inhaler Inhale 2 puffs every 6 (six) hours as needed for wheezing or shortness of breath 1 Inhaler 5    ascorbic acid (VITAMIN C) 500 mg tablet Take by mouth      azelastine (ASTELIN) 0 1 % nasal spray 1 spray into each nostril 2 (two) times a day      azelastine (OPTIVAR) 0 05 % ophthalmic solution INSTILL 1 DROP INTO AFFECTED EYE TWICE DAILY      B Complex Vitamins (VITAMIN B COMPLEX PO) Take by mouth      Bryhali 0 01 % LOTN       celecoxib (CeleBREX) 100 mg capsule Take 1 capsule (100 mg total) by mouth daily 30 capsule 0    Cetirizine HCl 10 MG CAPS Take 1 tablet by mouth daily      estazolam (PROSOM) 1 MG tablet Take 1 tablet (1 mg total) by mouth daily at bedtime 30 tablet 3    fluocinonide (LIDEX) 0 05 % cream Apply topically 2 (two) times a day No more than 2 weeks at a time 30 g 3    fluticasone (FLONASE) 50 mcg/act nasal spray 2 sprays into each nostril daily 1 Bottle 2    gabapentin (NEURONTIN) 100 mg capsule Take 1 capsule (100 mg total) by mouth 3 (three) times a day 90 capsule 3    Mirabegron ER (Myrbetriq) 25 MG TB24 Take 25 mg by mouth daily Per patient request, wants to try meds  First  14 tablet 3    nystatin (MYCOSTATIN) cream Apply to foot rash as needed 30 g 5    Sod Picosulfate-Mag Ox-Cit Acd (Clenpiq) 10-3 5-12 MG-GM -GM/160ML SOLN As directed (1 kit) 1 Bottle 0    Omega-3 Fatty Acids (FISH OIL) 1,000 mg Take by mouth       No current facility-administered medications for this visit  Allergies   Allergen Reactions    Latex Itching and Rash       Review of Systems   Constitutional: Positive for chills and fever  Respiratory: Positive for shortness of breath  Cardiovascular: Negative for chest pain, palpitations and leg swelling  Gastrointestinal: Negative for abdominal pain, blood in stool, diarrhea and nausea  Genitourinary: Positive for dysuria, frequency and urgency  Negative for vaginal bleeding and vaginal discharge         Video Exam    Vitals: 01/06/21 1555   Pulse: (!) 115   Temp: 100 2 °F (37 9 °C)   TempSrc: Oral   SpO2: 97%   Weight: 50 3 kg (111 lb)   Height: 5' (1 524 m)       Physical Exam  HENT:      Head: Normocephalic  Eyes:      Conjunctiva/sclera: Conjunctivae normal    Pulmonary:      Effort: Pulmonary effort is normal  No respiratory distress  Neurological:      Mental Status: She is alert and oriented to person, place, and time  Cranial Nerves: No cranial nerve deficit  Psychiatric:         Mood and Affect: Mood normal           I spent 25 minutes directly with the patient during this visit      VIRTUAL VISIT DISCLAIMER    Yennyadán Umer acknowledges that she has consented to an online visit or consultation  She understands that the online visit is based solely on information provided by her, and that, in the absence of a face-to-face physical evaluation by the physician, the diagnosis she receives is both limited and provisional in terms of accuracy and completeness  This is not intended to replace a full medical face-to-face evaluation by the physician  Chris Owusu understands and accepts these terms

## 2021-01-07 ENCOUNTER — HOSPITAL ENCOUNTER (OUTPATIENT)
Dept: RADIOLOGY | Facility: HOSPITAL | Age: 66
Discharge: HOME | End: 2021-01-07
Attending: INTERNAL MEDICINE
Payer: COMMERCIAL

## 2021-01-07 DIAGNOSIS — R06.00 DYSPNEA ON EXERTION: ICD-10-CM

## 2021-01-07 PROCEDURE — 71046 X-RAY EXAM CHEST 2 VIEWS: CPT

## 2021-01-08 ENCOUNTER — TELEMEDICINE (OUTPATIENT)
Dept: FAMILY MEDICINE CLINIC | Facility: HOSPITAL | Age: 66
End: 2021-01-08
Payer: COMMERCIAL

## 2021-01-08 VITALS — TEMPERATURE: 98.4 F | HEIGHT: 60 IN | BODY MASS INDEX: 21.79 KG/M2 | WEIGHT: 111 LBS

## 2021-01-08 DIAGNOSIS — J06.9 VIRAL UPPER RESPIRATORY INFECTION: ICD-10-CM

## 2021-01-08 DIAGNOSIS — R31.29 OTHER MICROSCOPIC HEMATURIA: Primary | ICD-10-CM

## 2021-01-08 LAB
ALBUMIN SERPL-MCNC: 3.9 G/DL (ref 3.6–5.1)
ALBUMIN/GLOB SERPL: 1.3 (CALC) (ref 1–2.5)
ALP SERPL-CCNC: 55 U/L (ref 37–153)
ALT SERPL-CCNC: 13 U/L (ref 6–29)
APPEARANCE UR: CLEAR
AST SERPL-CCNC: 18 U/L (ref 10–35)
BACTERIA UR QL AUTO: ABNORMAL /HPF
BASOPHILS # BLD AUTO: 50 CELLS/UL (ref 0–200)
BASOPHILS NFR BLD AUTO: 0.7 %
BILIRUB SERPL-MCNC: 0.6 MG/DL (ref 0.2–1.2)
BILIRUB UR QL STRIP: NEGATIVE
BUN SERPL-MCNC: 13 MG/DL (ref 7–25)
BUN/CREAT SERPL: ABNORMAL (CALC) (ref 6–22)
CALCIUM SERPL-MCNC: 9 MG/DL (ref 8.6–10.4)
CHLORIDE SERPL-SCNC: 98 MMOL/L (ref 98–110)
CO2 SERPL-SCNC: 25 MMOL/L (ref 20–32)
COLOR UR: YELLOW
CREAT SERPL-MCNC: 0.74 MG/DL (ref 0.5–0.99)
EOSINOPHIL # BLD AUTO: 259 CELLS/UL (ref 15–500)
EOSINOPHIL NFR BLD AUTO: 3.6 %
ERYTHROCYTE [DISTWIDTH] IN BLOOD BY AUTOMATED COUNT: 12.4 % (ref 11–15)
GLOBULIN SER CALC-MCNC: 2.9 G/DL (CALC) (ref 1.9–3.7)
GLUCOSE SERPL-MCNC: 87 MG/DL (ref 65–99)
GLUCOSE UR QL STRIP: NEGATIVE
HCT VFR BLD AUTO: 35.5 % (ref 35–45)
HGB BLD-MCNC: 11.9 G/DL (ref 11.7–15.5)
HGB UR QL STRIP: ABNORMAL
HYALINE CASTS #/AREA URNS LPF: ABNORMAL /LPF
KETONES UR QL STRIP: NEGATIVE
LEUKOCYTE ESTERASE UR QL STRIP: NEGATIVE
LYMPHOCYTES # BLD AUTO: 1282 CELLS/UL (ref 850–3900)
LYMPHOCYTES NFR BLD AUTO: 17.8 %
MCH RBC QN AUTO: 29.7 PG (ref 27–33)
MCHC RBC AUTO-ENTMCNC: 33.5 G/DL (ref 32–36)
MCV RBC AUTO: 88.5 FL (ref 80–100)
MONOCYTES # BLD AUTO: 1303 CELLS/UL (ref 200–950)
MONOCYTES NFR BLD AUTO: 18.1 %
NEUTROPHILS # BLD AUTO: 4306 CELLS/UL (ref 1500–7800)
NEUTROPHILS NFR BLD AUTO: 59.8 %
NITRITE UR QL STRIP: NEGATIVE
PH UR STRIP: 6.5 [PH] (ref 5–8)
PLATELET # BLD AUTO: 194 THOUSAND/UL (ref 140–400)
PMV BLD REES-ECKER: 10.3 FL (ref 7.5–12.5)
POTASSIUM SERPL-SCNC: 4.2 MMOL/L (ref 3.5–5.3)
PROT SERPL-MCNC: 6.8 G/DL (ref 6.1–8.1)
PROT UR QL STRIP: NEGATIVE
RBC # BLD AUTO: 4.01 MILLION/UL (ref 3.8–5.1)
RBC #/AREA URNS HPF: ABNORMAL /HPF
SL AMB EGFR AFRICAN AMERICAN: 99 ML/MIN/1.73M2
SL AMB EGFR NON AFRICAN AMERICAN: 86 ML/MIN/1.73M2
SODIUM SERPL-SCNC: 133 MMOL/L (ref 135–146)
SP GR UR STRIP: 1.01 (ref 1–1.03)
SQUAMOUS #/AREA URNS HPF: ABNORMAL /HPF
WBC # BLD AUTO: 7.2 THOUSAND/UL (ref 3.8–10.8)
WBC #/AREA URNS HPF: ABNORMAL /HPF

## 2021-01-08 PROCEDURE — 3008F BODY MASS INDEX DOCD: CPT | Performed by: PHYSICIAN ASSISTANT

## 2021-01-08 PROCEDURE — 99213 OFFICE O/P EST LOW 20 MIN: CPT | Performed by: INTERNAL MEDICINE

## 2021-01-08 PROCEDURE — 3725F SCREEN DEPRESSION PERFORMED: CPT | Performed by: INTERNAL MEDICINE

## 2021-01-08 NOTE — PROGRESS NOTES
Virtual Regular Visit      Assessment/Plan:    Problem List Items Addressed This Visit     None      Visit Diagnoses     Other microscopic hematuria    -  Primary    Viral upper respiratory infection            I suspect patient has slowly temperature, tiredness, fatigue, cough headache could be still related to recent COVID-19 infection  She is taking Augmentin which should cover bacterial pathogens that could cause otitis media or bacterial sinus infection  Asked her to complete the course of Augmentin  Chest x-ray showed no pneumonia  Patient asked me whether there is a role for steroids and I told her that this point there was no role for steroids  Patient has an appointment scheduled this month with Urology for evaluation of microscopic hematuria that I asked her to keep  Reason for visit is   Chief Complaint   Patient presents with    Virtual Regular Visit    Virtual Regular Visit        Encounter provider Riley Zhou MD    Provider located at [address] [place] [address]      Recent Visits  Date Type Provider Dept   01/06/21 Telemedicine Riley Zhou MD [address] [facility]   01/05/21 Telemedicine David Kunz MD [facility]   Showing recent visits within past 7 days and meeting all other requirements     Today's Visits  Date Type Provider Dept   01/08/21 [facility]medicine [facility]   Showing today's visits and meeting all other requirements     Future Appointments  No visits were found meeting these conditions  Showing future appointments within next 150 days and meeting all other requirements        The patient was identified by name and date of birth   Reno Pabon was informed that this is a telemedicine visit and that the visit is being conducted through [employer] and patient was informed that this is not a secure, HIPAA-compliant platform  She agrees to proceed     My office door was closed  No one else was in the room  She acknowledged consent and understanding of privacy and security of the video platform  The patient has agreed to participate and understands they can discontinue the visit at any time  Patient is aware this is a billable service  Subjective  Mellissa Dixon is a 60 y o  female follow-up upper respiratory infection symptoms   This is a follow-up of appointment before fevers, chills, cough, right ear pain and difficulty urinating  Patient has had low-grade temperatures till yesterday:  Temperature was as high than 0 6  She has had no low-grade temperatures today  She still has a dry cough, she feels tired and fatigued  She has a right temporal headache and right ear pain  She has nasal congestion but denies postnasal dripping  She denies any dyspnea, chest pain, lower extremity edema  She has been taking Augmentin for possible sinus infection and she tolerates that without abdominal pain or diarrhea  Chest x-ray showed no focal infiltrates suggestive of COVID pneumonia or back to pneumonia  Urinalysis was significant for hematuria, CBC showed no leukocytosis anemia is CMP was unremarkable             Past Medical History:   Diagnosis Date    Decreased bone density     Low back pain radiating down leg     upper back  pain also    Organ donor        Past Surgical History:   Procedure Laterality Date    BILATERAL OOPHORECTOMY      COLONOSCOPY      HYSTERECTOMY         Current Outpatient Medications   Medication Sig Dispense Refill    albuterol (PROVENTIL HFA,VENTOLIN HFA) 90 mcg/act inhaler Inhale 2 puffs every 6 (six) hours as needed for wheezing or shortness of breath 1 Inhaler 5    ascorbic acid (VITAMIN C) 500 mg tablet Take by mouth      azelastine (ASTELIN) 0 1 % nasal spray 1 spray into each nostril 2 (two) times a day      azelastine (OPTIVAR) 0 05 % ophthalmic solution INSTILL 1 DROP INTO AFFECTED EYE TWICE DAILY      B Complex Vitamins (VITAMIN B COMPLEX PO) Take by mouth      Bryhali 0 01 % LOTN       celecoxib (CeleBREX) 100 mg capsule Take 1 capsule (100 mg total) by mouth daily 30 capsule 0    Cetirizine HCl 10 MG CAPS Take 1 tablet by mouth daily      estazolam (PROSOM) 1 MG tablet Take 1 tablet (1 mg total) by mouth daily at bedtime 30 tablet 3    fluocinonide (LIDEX) 0 05 % cream Apply topically 2 (two) times a day No more than 2 weeks at a time 30 g 3    fluticasone (FLONASE) 50 mcg/act nasal spray 2 sprays into each nostril daily 1 Bottle 2    gabapentin (NEURONTIN) 100 mg capsule Take 1 capsule (100 mg total) by mouth 3 (three) times a day 90 capsule 3    Mirabegron ER (Myrbetriq) 25 MG TB24 Take 25 mg by mouth daily Per patient request, wants to try meds  First  14 tablet 3    nystatin (MYCOSTATIN) cream Apply to foot rash as needed 30 g 5    Omega-3 Fatty Acids (FISH OIL) 1,000 mg Take by mouth      Sod Picosulfate-Mag Ox-Cit Acd (Clenpiq) 10-3 5-12 MG-GM -GM/160ML SOLN As directed (1 kit) 1 Bottle 0     No current facility-administered medications for this visit  Allergies   Allergen Reactions    Latex Itching and Rash       Review of Systems   Constitutional: Positive for chills and fatigue  Respiratory: Positive for cough  Negative for shortness of breath and wheezing  Cardiovascular: Negative for chest pain and leg swelling  Gastrointestinal: Negative for abdominal pain, diarrhea and vomiting  Genitourinary: Negative for dysuria and hematuria  Neurological: Positive for headaches  Video Exam    Vitals:    01/08/21 1430   Temp: 98 4 °F (36 9 °C)   TempSrc: Oral   Weight: 50 3 kg (111 lb)   Height: 5' (1 524 m)       Physical Exam  Constitutional:       General: She is not in acute distress    Eyes:      Conjunctiva/sclera: Conjunctivae normal    Pulmonary:      Effort: Pulmonary effort is normal  No respiratory distress  Neurological:      Mental Status: She is alert and oriented to person, place, and time  Cranial Nerves: No cranial nerve deficit  I spent 15 minutes directly with the patient during this visit      VIRTUAL VISIT DISCLAIMER    Raynold Landau acknowledges that she has consented to an online visit or consultation  She understands that the online visit is based solely on information provided by her, and that, in the absence of a face-to-face physical evaluation by the physician, the diagnosis she receives is both limited and provisional in terms of accuracy and completeness  This is not intended to replace a full medical face-to-face evaluation by the physician  Raynold Landau understands and accepts these terms

## 2021-01-18 ENCOUNTER — TELEMEDICINE (OUTPATIENT)
Dept: FAMILY MEDICINE CLINIC | Facility: HOSPITAL | Age: 66
End: 2021-01-18
Payer: COMMERCIAL

## 2021-01-18 VITALS — BODY MASS INDEX: 21.68 KG/M2 | WEIGHT: 111 LBS | TEMPERATURE: 98.6 F

## 2021-01-18 DIAGNOSIS — G47.9 SLEEP DISTURBANCE: ICD-10-CM

## 2021-01-18 PROCEDURE — 1036F TOBACCO NON-USER: CPT | Performed by: PHYSICIAN ASSISTANT

## 2021-01-18 PROCEDURE — 99213 OFFICE O/P EST LOW 20 MIN: CPT | Performed by: PHYSICIAN ASSISTANT

## 2021-01-18 RX ORDER — ALENDRONATE SODIUM 70 MG/1
TABLET ORAL
COMMUNITY
Start: 2020-12-24

## 2021-01-18 RX ORDER — ESTAZOLAM 1 MG
1 TABLET ORAL
Qty: 30 TABLET | Refills: 3 | Status: SHIPPED | OUTPATIENT
Start: 2021-01-18 | End: 2021-02-03 | Stop reason: SDUPTHER

## 2021-01-18 RX ORDER — MELATONIN
2000 DAILY
COMMUNITY
Start: 2021-01-18

## 2021-01-18 RX ORDER — ESTAZOLAM 1 MG
1 TABLET ORAL
Qty: 30 TABLET | Refills: 3 | OUTPATIENT
Start: 2021-01-18 | End: 2021-03-10 | Stop reason: SDUPTHER

## 2021-01-18 NOTE — PROGRESS NOTES
Virtual Regular Visit      Assessment/Plan:    URI- viral- continue pushing fluids, and vitamins    Sleep disturbance- Prosom refilled    Frequent urination-  Follow up with urology           Reason for visit is   Chief Complaint   Patient presents with    Virtual Regular Visit    Virtual Regular Visit        Encounter provider Karena Noriega PA-C    Provider located at [place] [address] [place] [address]      Recent Visits  No visits were found meeting these conditions  Showing recent visits within past 7 days and meeting all other requirements     Today's Visits  Date Type Provider Dept   01/18/21 Telemedicine Karena Noriega PA-C [facility]   Showing today's visits and meeting all other requirements     Future Appointments  No visits were found meeting these conditions  Showing future appointments within next 150 days and meeting all other requirements        The patient was identified by name and date of birth  Yuni Schaefer was informed that this is a telemedicine visit and that the visit is being conducted through [employer] and patient was informed that this is not a secure, HIPAA-compliant platform  She agrees to proceed     My office door was closed  No one else was in the room  She acknowledged consent and understanding of privacy and security of the video platform  The patient has agreed to participate and understands they can discontinue the visit at any time  Patient is aware this is a billable service  Subjective  Yuni Schaefer is a 60 y o  female   C/o relapse of COVID sx  On 1/4/2021  Has sweats, chills and weakness; intermittent fever  Had apt  With Dr Elgin Anderson, and Dr Corky Garcia recently, within past few weeks  Needs few more weeks for recovery  Still waking up 6 times at night for frequent urination      Has developed insomnia, trouble falling asleep; end up falling asleep around 3 am, and wakes up around 6 am    Plans to see urologist          Past Medical History:   Diagnosis Date    Decreased bone density     Low back pain radiating down leg     upper back  pain also    Organ donor        Past Surgical History:   Procedure Laterality Date    BILATERAL OOPHORECTOMY      COLONOSCOPY      HYSTERECTOMY         Current Outpatient Medications   Medication Sig Dispense Refill    albuterol (PROVENTIL HFA,VENTOLIN HFA) 90 mcg/act inhaler Inhale 2 puffs every 6 (six) hours as needed for wheezing or shortness of breath 1 Inhaler 5    ascorbic acid (VITAMIN C) 500 mg tablet Take by mouth      azelastine (ASTELIN) 0 1 % nasal spray 1 spray into each nostril 2 (two) times a day      azelastine (OPTIVAR) 0 05 % ophthalmic solution INSTILL 1 DROP INTO AFFECTED EYE TWICE DAILY      Cetirizine HCl 10 MG CAPS Take 1 tablet by mouth daily      estazolam (PROSOM) 1 MG tablet Take 1 tablet (1 mg total) by mouth daily at bedtime 30 tablet 3    estazolam (PROSOM) 1 MG tablet Take 1 tablet (1 mg total) by mouth daily at bedtime 30 tablet 3    fluticasone (FLONASE) 50 mcg/act nasal spray 2 sprays into each nostril daily 1 Bottle 2    gabapentin (NEURONTIN) 100 mg capsule Take 1 capsule (100 mg total) by mouth 3 (three) times a day 90 capsule 3    nystatin (MYCOSTATIN) cream Apply to foot rash as needed 30 g 5    alendronate (FOSAMAX) 70 mg tablet TAKE ONE TABLET BY MOUTH EVERY 7 DAYS  TAKE ON AN EMPTY STOMACH WITH WATER AND DO NOT LIE DOWN FOR 45 MINUTES AFTERWARDS        B Complex Vitamins (VITAMIN B COMPLEX PO) Take by mouth      Bryhali 0 01 % LOTN       celecoxib (CeleBREX) 100 mg capsule Take 1 capsule (100 mg total) by mouth daily (Patient not taking: Reported on 1/18/2021) 30 capsule 0    cholecalciferol (VITAMIN D3) 1,000 units tablet Take 2 tablets (2,000 Units total) by mouth daily      fluocinonide (LIDEX) 0 05 % cream Apply topically 2 (two) times a day No more than 2 weeks at a time (Patient not taking: Reported on 1/18/2021) 30 g 3    Mirabegron ER (Myrbetriq) 25 MG TB24 Take 25 mg by mouth daily Per patient request, wants to try meds  First  (Patient not taking: Reported on 1/18/2021) 14 tablet 3     No current facility-administered medications for this visit  Allergies   Allergen Reactions    Latex Itching and Rash       Review of Systems   Constitutional: Positive for chills, diaphoresis, fatigue and fever  HENT: Positive for ear pain  Negative for congestion, rhinorrhea and sore throat  Respiratory: Positive for cough, chest tightness and shortness of breath  Gastrointestinal: Negative for abdominal pain, nausea and vomiting  Genitourinary: Positive for frequency  Negative for dysuria  Neurological: Positive for weakness  Video Exam    Vitals:    01/18/21 0928   Temp: 98 6 °F (37 °C)   Weight: 50 3 kg (111 lb)       Physical Exam  Constitutional:       General: She is not in acute distress  Appearance: Normal appearance  She is ill-appearing and toxic-appearing  She is not diaphoretic  Comments: Appears very pale  Pulmonary:      Effort: Pulmonary effort is normal    Neurological:      General: No focal deficit present  Mental Status: She is alert and oriented to person, place, and time  Psychiatric:         Mood and Affect: Mood normal          Behavior: Behavior normal          Thought Content: Thought content normal          Judgment: Judgment normal           I spent 20 minutes directly with the patient during this visit      VIRTUAL VISIT DISCLAIMER    Edmonia Or acknowledges that she has consented to an online visit or consultation  She understands that the online visit is based solely on information provided by her, and that, in the absence of a face-to-face physical evaluation by the physician, the diagnosis she receives is both limited and provisional in terms of accuracy and completeness   This is not intended to replace a full medical face-to-face evaluation by the physician  Tamera Alicea understands and accepts these terms

## 2021-01-20 ENCOUNTER — TELEPHONE (OUTPATIENT)
Dept: FAMILY MEDICINE CLINIC | Facility: HOSPITAL | Age: 66
End: 2021-01-20

## 2021-01-20 DIAGNOSIS — J02.9 PHARYNGITIS, UNSPECIFIED ETIOLOGY: ICD-10-CM

## 2021-01-20 RX ORDER — AMOXICILLIN AND CLAVULANATE POTASSIUM 875; 125 MG/1; MG/1
1 TABLET, FILM COATED ORAL EVERY 12 HOURS SCHEDULED
Qty: 20 TABLET | Refills: 0 | Status: SHIPPED | OUTPATIENT
Start: 2021-01-20 | End: 2021-01-30

## 2021-01-21 PROBLEM — M81.0 POSTMENOPAUSAL OSTEOPOROSIS: Status: ACTIVE | Noted: 2020-12-24

## 2021-01-21 PROBLEM — R35.0 FREQUENT URINATION: Status: ACTIVE | Noted: 2021-01-21

## 2021-01-29 ENCOUNTER — TRANSCRIBE ORDERS (OUTPATIENT)
Dept: ADMINISTRATIVE | Facility: HOSPITAL | Age: 66
End: 2021-01-29

## 2021-01-29 DIAGNOSIS — R00.2 PALPITATIONS: Primary | ICD-10-CM

## 2021-02-03 ENCOUNTER — TRANSCRIBE ORDERS (OUTPATIENT)
Dept: ADMINISTRATIVE | Facility: HOSPITAL | Age: 66
End: 2021-02-03

## 2021-02-03 ENCOUNTER — TELEPHONE (OUTPATIENT)
Dept: FAMILY MEDICINE CLINIC | Facility: HOSPITAL | Age: 66
End: 2021-02-03

## 2021-02-03 DIAGNOSIS — N39.0 URINARY TRACT INFECTION WITHOUT HEMATURIA, SITE UNSPECIFIED: ICD-10-CM

## 2021-02-03 DIAGNOSIS — R68.89 OTHER GENERAL SYMPTOMS AND SIGNS: ICD-10-CM

## 2021-02-03 DIAGNOSIS — U07.1 COVID-19: Primary | ICD-10-CM

## 2021-02-03 DIAGNOSIS — R06.02 SHORTNESS OF BREATH: ICD-10-CM

## 2021-02-03 RX ORDER — OXYBUTYNIN CHLORIDE 5 MG/1
5 TABLET, EXTENDED RELEASE ORAL DAILY
COMMUNITY
Start: 2021-01-20 | End: 2021-04-12 | Stop reason: ALTCHOICE

## 2021-02-03 RX ORDER — CIPROFLOXACIN 500 MG/1
500 TABLET, FILM COATED ORAL EVERY 12 HOURS SCHEDULED
Qty: 14 TABLET | Refills: 0 | Status: SHIPPED | OUTPATIENT
Start: 2021-02-03 | End: 2021-02-10

## 2021-02-03 NOTE — TELEPHONE ENCOUNTER
Pt states she needs a note for her employer to return to work on 2/1/21  Pt can be reached at [phone]  Letter can be faxed to Claudette Helling is [phone]  Pt also developed a UTI this morning  Can we call in Cipro 500 mg

## 2021-02-08 ENCOUNTER — TRANSCRIBE ORDERS (OUTPATIENT)
Dept: ADMINISTRATIVE | Facility: HOSPITAL | Age: 66
End: 2021-02-08

## 2021-03-05 ENCOUNTER — TELEMEDICINE (OUTPATIENT)
Dept: FAMILY MEDICINE CLINIC | Facility: HOSPITAL | Age: 66
End: 2021-03-05
Payer: COMMERCIAL

## 2021-03-05 VITALS — BODY MASS INDEX: 21.68 KG/M2 | TEMPERATURE: 97.9 F | WEIGHT: 111 LBS

## 2021-03-05 DIAGNOSIS — J06.9 VIRAL UPPER RESPIRATORY TRACT INFECTION: Primary | ICD-10-CM

## 2021-03-05 PROCEDURE — 99213 OFFICE O/P EST LOW 20 MIN: CPT | Performed by: PHYSICIAN ASSISTANT

## 2021-03-05 PROCEDURE — 1036F TOBACCO NON-USER: CPT | Performed by: PHYSICIAN ASSISTANT

## 2021-03-05 RX ORDER — OSELTAMIVIR PHOSPHATE 75 MG/1
75 CAPSULE ORAL 2 TIMES DAILY
Qty: 10 CAPSULE | Refills: 0 | Status: SHIPPED | OUTPATIENT
Start: 2021-03-05 | End: 2021-03-10

## 2021-03-10 DIAGNOSIS — Z23 ENCOUNTER FOR IMMUNIZATION: ICD-10-CM

## 2021-03-10 DIAGNOSIS — G47.9 SLEEP DISTURBANCE: ICD-10-CM

## 2021-03-10 RX ORDER — ESTAZOLAM 1 MG
1 TABLET ORAL
Qty: 30 TABLET | Refills: 0 | Status: SHIPPED | OUTPATIENT
Start: 2021-03-10 | End: 2021-04-12 | Stop reason: SDUPTHER

## 2021-04-09 ENCOUNTER — IMMUNIZATIONS (OUTPATIENT)
Dept: FAMILY MEDICINE CLINIC | Facility: HOSPITAL | Age: 66
End: 2021-04-09

## 2021-04-09 DIAGNOSIS — Z23 ENCOUNTER FOR IMMUNIZATION: Primary | ICD-10-CM

## 2021-04-09 PROCEDURE — 0011A SARS-COV-2 / COVID-19 MRNA VACCINE (MODERNA) 100 MCG: CPT

## 2021-04-09 PROCEDURE — 91301 SARS-COV-2 / COVID-19 MRNA VACCINE (MODERNA) 100 MCG: CPT

## 2021-05-12 ENCOUNTER — IMMUNIZATIONS (OUTPATIENT)
Dept: FAMILY MEDICINE CLINIC | Facility: HOSPITAL | Age: 66
End: 2021-05-12

## 2021-05-12 DIAGNOSIS — Z23 ENCOUNTER FOR IMMUNIZATION: Primary | ICD-10-CM

## 2021-05-12 PROCEDURE — 0012A SARS-COV-2 / COVID-19 MRNA VACCINE (MODERNA) 100 MCG: CPT

## 2021-05-12 PROCEDURE — 91301 SARS-COV-2 / COVID-19 MRNA VACCINE (MODERNA) 100 MCG: CPT

## 2021-05-19 ENCOUNTER — TELEMEDICINE (OUTPATIENT)
Dept: FAMILY MEDICINE CLINIC | Facility: HOSPITAL | Age: 66
End: 2021-05-19
Payer: COMMERCIAL

## 2021-05-19 DIAGNOSIS — J30.1 SEASONAL ALLERGIC RHINITIS DUE TO POLLEN: Primary | ICD-10-CM

## 2021-05-19 PROCEDURE — 99213 OFFICE O/P EST LOW 20 MIN: CPT | Performed by: PHYSICIAN ASSISTANT

## 2021-05-19 RX ORDER — OLOPATADINE HYDROCHLORIDE 1 MG/ML
1 SOLUTION/ DROPS OPHTHALMIC 2 TIMES DAILY
Qty: 5 ML | Refills: 3 | Status: SHIPPED | OUTPATIENT
Start: 2021-05-19

## 2021-05-19 NOTE — PROGRESS NOTES
Virtual Regular Visit      Assessment/Plan:    Allergic rhinitis  Recommend patanol ophth solution and otc Claritin         Reason for visit is   Chief Complaint   Patient presents with    Virtual Regular Visit        Encounter provider Arlina Councilman, PA-C    Provider located at [place] [address] [place] [address]      Recent Visits  No visits were found meeting these conditions  Showing recent visits within past 7 days and meeting all other requirements     Future Appointments  No visits were found meeting these conditions  Showing future appointments within next 150 days and meeting all other requirements        The patient was identified by name and date of birth  Solitario Robledo was informed that this is a telemedicine visit and that the visit is being conducted through [address] [facility] and patient was informed that this is a secure, HIPAA-compliant platform  She agrees to proceed     My office door was closed  No one else was in the room  She acknowledged consent and understanding of privacy and security of the video platform  The patient has agreed to participate and understands they can discontinue the visit at any time  Patient is aware this is a billable service  Subjective  Solitario Robledo is a 73 y o  female   C/o worsening allergy sx  And itchy eyes  Was using benadryl, and otc Zyrtec  Was rubbing eyes more than usual    Now having pain in right eye  Has itching all over body, even skin, and ears  Tried allergy eye drops with no relief            Past Medical History:   Diagnosis Date    Decreased bone density     Low back pain radiating down leg     upper back  pain also    Organ donor        Past Surgical History:   Procedure Laterality Date    BILATERAL OOPHORECTOMY      COLONOSCOPY      HYSTERECTOMY         Current Outpatient Medications   Medication Sig Dispense Refill    albuterol (PROVENTIL HFA,VENTOLIN HFA) 90 mcg/act inhaler Inhale 2 puffs every 6 (six) hours as needed for wheezing or shortness of breath 1 Inhaler 5    alendronate (FOSAMAX) 70 mg tablet TAKE ONE TABLET BY MOUTH EVERY 7 DAYS  TAKE ON AN EMPTY STOMACH WITH WATER AND DO NOT LIE DOWN FOR 45 MINUTES AFTERWARDS   ascorbic acid (VITAMIN C) 500 mg tablet Take by mouth      azelastine (ASTELIN) 0 1 % nasal spray 1 spray into each nostril 2 (two) times a day      azelastine (OPTIVAR) 0 05 % ophthalmic solution INSTILL 1 DROP INTO AFFECTED EYE TWICE DAILY      B Complex Vitamins (VITAMIN B COMPLEX PO) Take by mouth      Bryhali 0 01 % LOTN       celecoxib (CeleBREX) 100 mg capsule Take 1 capsule (100 mg total) by mouth daily (Patient not taking: Reported on 1/18/2021) 30 capsule 0    Cetirizine HCl 10 MG CAPS Take 1 tablet by mouth daily      cholecalciferol (VITAMIN D3) 1,000 units tablet Take 2 tablets (2,000 Units total) by mouth daily      estazolam (PROSOM) 1 MG tablet Take 1 tablet (1 mg total) by mouth daily at bedtime 30 tablet 0    fluocinonide (LIDEX) 0 05 % cream Apply topically 2 (two) times a day No more than 2 weeks at a time 30 g 3    fluticasone (FLONASE) 50 mcg/act nasal spray 2 sprays into each nostril daily 1 Bottle 2    gabapentin (NEURONTIN) 100 mg capsule Take 1 capsule (100 mg total) by mouth 3 (three) times a day (Patient not taking: Reported on 3/5/2021) 90 capsule 3    nystatin (MYCOSTATIN) cream Apply to foot rash as needed 30 g 5     No current facility-administered medications for this visit  Allergies   Allergen Reactions    Latex Itching and Rash       Review of Systems   Constitutional: Negative for chills, diaphoresis, fatigue and fever  HENT: Positive for congestion  Negative for ear pain, rhinorrhea and sore throat  Eyes: Positive for pain, redness and itching  Negative for discharge  Has watery eyes       Respiratory: Negative for cough, chest tightness and shortness of breath  Neurological: Negative for dizziness, light-headedness and headaches  Video Exam    There were no vitals filed for this visit  Physical Exam  Constitutional:       General: She is not in acute distress  Appearance: Normal appearance  She is not ill-appearing or diaphoretic  HENT:      Head: Normocephalic and atraumatic  Eyes:      General: No scleral icterus  Right eye: No discharge  Left eye: No discharge  Extraocular Movements: Extraocular movements intact  Conjunctiva/sclera: Conjunctivae normal    Pulmonary:      Effort: Pulmonary effort is normal    Neurological:      General: No focal deficit present  Mental Status: She is alert and oriented to person, place, and time  I spent 15 minutes directly with the patient during this visit      VIRTUAL VISIT DISCLAIMER    Annette Ceja acknowledges that she has consented to an online visit or consultation  She understands that the online visit is based solely on information provided by her, and that, in the absence of a face-to-face physical evaluation by the physician, the diagnosis she receives is both limited and provisional in terms of accuracy and completeness  This is not intended to replace a full medical face-to-face evaluation by the physician  Annette Ceja understands and accepts these terms

## 2021-08-31 DIAGNOSIS — G47.9 SLEEP DISTURBANCE: ICD-10-CM

## 2021-09-01 DIAGNOSIS — J30.1 SEASONAL ALLERGIC RHINITIS DUE TO POLLEN: Primary | ICD-10-CM

## 2021-09-01 RX ORDER — AZELASTINE 1 MG/ML
1 SPRAY, METERED NASAL 2 TIMES DAILY
Qty: 30 ML | Refills: 3 | Status: SHIPPED | OUTPATIENT
Start: 2021-09-01

## 2021-09-01 RX ORDER — ESTAZOLAM 1 MG
1 TABLET ORAL
Qty: 30 TABLET | Refills: 0 | Status: SHIPPED | OUTPATIENT
Start: 2021-09-01

## 2022-07-07 ENCOUNTER — NEW PATIENT (OUTPATIENT)
Dept: URBAN - METROPOLITAN AREA CLINIC 6 | Facility: CLINIC | Age: 67
End: 2022-07-07

## 2022-07-07 DIAGNOSIS — H25.813: ICD-10-CM

## 2022-07-07 DIAGNOSIS — H43.813: ICD-10-CM

## 2022-07-07 PROCEDURE — 92004 COMPRE OPH EXAM NEW PT 1/>: CPT

## 2022-07-07 ASSESSMENT — VISUAL ACUITY
OU_CC: J2
OS_CC: 20/30+1
OD_CC: 20/25+1

## 2022-07-07 ASSESSMENT — TONOMETRY
OS_IOP_MMHG: 10
OD_IOP_MMHG: 10

## 2023-05-12 NOTE — TELEPHONE ENCOUNTER
Spoke to patient, patient did go back to work, on 1/4/2021, and after 4 hours, became very tired; SOB  After waking up on 1/11/2021, still was having sx  And fatigue  Also having bad urinary symptoms, and plans to see urologist    Christina Schneider to take sleeping pill to have better night sleep  Has apt  With Dr Presley John, urology,  for hematuria  Patient has multiple medical issues, and want to make sure able to return to work without any issues  12-May-2023 11:51
